# Patient Record
Sex: MALE | Race: WHITE | Employment: OTHER | URBAN - METROPOLITAN AREA
[De-identification: names, ages, dates, MRNs, and addresses within clinical notes are randomized per-mention and may not be internally consistent; named-entity substitution may affect disease eponyms.]

---

## 2017-11-03 PROBLEM — Z95.1 S/P CABG (CORONARY ARTERY BYPASS GRAFT): Status: ACTIVE | Noted: 2017-11-03

## 2017-11-03 PROBLEM — Z86.79 HISTORY OF ATRIAL FLUTTER: Status: ACTIVE | Noted: 2017-11-03

## 2017-11-03 PROBLEM — I44.0 FIRST DEGREE AV BLOCK: Status: ACTIVE | Noted: 2017-11-03

## 2020-06-14 PROBLEM — I73.9 PERIPHERAL VASCULAR DISEASE (HCC): Status: ACTIVE | Noted: 2020-06-14

## 2020-06-24 ENCOUNTER — HOSPITAL ENCOUNTER (OUTPATIENT)
Dept: CT IMAGING | Age: 85
Discharge: HOME | End: 2020-06-24
Attending: FAMILY MEDICINE

## 2020-06-24 DIAGNOSIS — F03.90 DEMENTIA (HCC): ICD-10-CM

## 2020-08-04 PROBLEM — G30.1 LATE ONSET ALZHEIMER'S DISEASE WITHOUT BEHAVIORAL DISTURBANCE (HCC): Status: ACTIVE | Noted: 2020-08-04

## 2020-08-04 PROBLEM — E53.8 B12 DEFICIENCY: Status: ACTIVE | Noted: 2020-08-04

## 2020-08-04 PROBLEM — G62.89 AXONAL NEUROPATHY: Status: ACTIVE | Noted: 2020-08-04

## 2020-08-04 PROBLEM — F02.80 LATE ONSET ALZHEIMER'S DISEASE WITHOUT BEHAVIORAL DISTURBANCE (HCC): Status: ACTIVE | Noted: 2020-08-04

## 2020-09-26 ENCOUNTER — HOSPITAL ENCOUNTER (EMERGENCY)
Age: 85
Discharge: HOME | End: 2020-09-26
Attending: EMERGENCY MEDICINE
Payer: MEDICARE

## 2020-09-26 ENCOUNTER — APPOINTMENT (OUTPATIENT)
Dept: CT IMAGING | Age: 85
End: 2020-09-26
Attending: EMERGENCY MEDICINE
Payer: MEDICARE

## 2020-09-26 ENCOUNTER — APPOINTMENT (OUTPATIENT)
Dept: GENERAL RADIOLOGY | Age: 85
End: 2020-09-26
Attending: EMERGENCY MEDICINE
Payer: MEDICARE

## 2020-09-26 VITALS
HEIGHT: 72 IN | HEART RATE: 69 BPM | SYSTOLIC BLOOD PRESSURE: 165 MMHG | DIASTOLIC BLOOD PRESSURE: 74 MMHG | WEIGHT: 184 LBS | OXYGEN SATURATION: 97 % | RESPIRATION RATE: 27 BRPM | TEMPERATURE: 98.5 F | BODY MASS INDEX: 24.92 KG/M2

## 2020-09-26 DIAGNOSIS — S00.03XA CONTUSION OF SCALP, INITIAL ENCOUNTER: ICD-10-CM

## 2020-09-26 DIAGNOSIS — M25.512 ACUTE PAIN OF LEFT SHOULDER: Primary | ICD-10-CM

## 2020-09-26 DIAGNOSIS — R60.9 PERIPHERAL EDEMA: ICD-10-CM

## 2020-09-26 LAB
ANION GAP SERPL CALC-SCNC: 5 MMOL/L (ref 7–16)
BNP SERPL-MCNC: 821 PG/ML
BUN SERPL-MCNC: 20 MG/DL (ref 8–23)
CALCIUM SERPL-MCNC: 8.7 MG/DL (ref 8.3–10.4)
CHLORIDE SERPL-SCNC: 109 MMOL/L (ref 98–107)
CO2 SERPL-SCNC: 28 MMOL/L (ref 21–32)
CREAT SERPL-MCNC: 1.34 MG/DL (ref 0.8–1.5)
GLUCOSE SERPL-MCNC: 117 MG/DL (ref 65–100)
MAGNESIUM SERPL-MCNC: 2.2 MG/DL (ref 1.8–2.4)
POTASSIUM SERPL-SCNC: 4.3 MMOL/L (ref 3.5–5.1)
SODIUM SERPL-SCNC: 142 MMOL/L (ref 136–145)
TROPONIN-HIGH SENSITIVITY: 12.4 PG/ML (ref 0–14)

## 2020-09-26 PROCEDURE — 73030 X-RAY EXAM OF SHOULDER: CPT

## 2020-09-26 PROCEDURE — 96375 TX/PRO/DX INJ NEW DRUG ADDON: CPT

## 2020-09-26 PROCEDURE — 83735 ASSAY OF MAGNESIUM: CPT

## 2020-09-26 PROCEDURE — 93005 ELECTROCARDIOGRAM TRACING: CPT | Performed by: EMERGENCY MEDICINE

## 2020-09-26 PROCEDURE — 80048 BASIC METABOLIC PNL TOTAL CA: CPT

## 2020-09-26 PROCEDURE — 99284 EMERGENCY DEPT VISIT MOD MDM: CPT

## 2020-09-26 PROCEDURE — 70450 CT HEAD/BRAIN W/O DYE: CPT

## 2020-09-26 PROCEDURE — 74011250636 HC RX REV CODE- 250/636: Performed by: EMERGENCY MEDICINE

## 2020-09-26 PROCEDURE — 83880 ASSAY OF NATRIURETIC PEPTIDE: CPT

## 2020-09-26 PROCEDURE — 84484 ASSAY OF TROPONIN QUANT: CPT

## 2020-09-26 PROCEDURE — 96374 THER/PROPH/DIAG INJ IV PUSH: CPT

## 2020-09-26 RX ORDER — MORPHINE SULFATE 2 MG/ML
4 INJECTION, SOLUTION INTRAMUSCULAR; INTRAVENOUS
Status: COMPLETED | OUTPATIENT
Start: 2020-09-26 | End: 2020-09-26

## 2020-09-26 RX ORDER — HYDROCHLOROTHIAZIDE 25 MG/1
25 TABLET ORAL DAILY
Qty: 7 TAB | Refills: 0 | Status: SHIPPED | OUTPATIENT
Start: 2020-09-26 | End: 2020-10-03

## 2020-09-26 RX ORDER — ONDANSETRON 2 MG/ML
4 INJECTION INTRAMUSCULAR; INTRAVENOUS
Status: COMPLETED | OUTPATIENT
Start: 2020-09-26 | End: 2020-09-26

## 2020-09-26 RX ADMIN — MORPHINE SULFATE 4 MG: 2 INJECTION, SOLUTION INTRAMUSCULAR; INTRAVENOUS at 19:45

## 2020-09-26 RX ADMIN — ONDANSETRON 4 MG: 2 INJECTION INTRAMUSCULAR; INTRAVENOUS at 19:55

## 2020-09-26 NOTE — ED PROVIDER NOTES
Chief complaint : Arm pain    HISTORY OF PRESENT ILLNESS :  Location : Left shoulder area    Quality : Ill-defined due to dementia    Quantity : Constant and acute    Timing :  Fell 9 days ago on the preceding Thursday however only started complaining of pain today    Severity : Ill-defined due to dementia    Alleviating / exacerbating factors : Arm pain seems to worsen somewhat with activity/movement    Associated Symptoms : Confounding historian due to dementia, if you ask if his neck hurts he answers affirmatively, then a moment later if he asked whether his neck feels fine, he again answers affirmatively    7:09 PM  Late breaking history, daughter indicates he was recently diagnosed with neuropathy and is concerned about his leg pain. Denied leg pain to me on initial history but she states \"you cannot trust what he says\". Later during the anticipated \"discharge debriefing\" when I confirm shoulder x-ray and head scanner finding, the daughter then volunteers \"oh, I was just worried he was having a heart attack\". Patient does have previous cardiac issues, but reported no chest pain on initial history,     Daughters asked what made her think it may be his heart, she reported that he was having arm pain from his shoulder all the way distally, again patient intimated to me that it was just his shoulder. 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       History reviewed. No pertinent surgical history. History reviewed. No pertinent family history.     Social History     Socioeconomic History    Marital status:      Spouse name: Not on file    Number of children: Not on file    Years of education: Not on file    Highest education level: Not on file   Occupational History    Not on file   Social Needs    Financial resource strain: Not on file    Food insecurity     Worry: Not on file Inability: Not on file    Transportation needs     Medical: Not on file     Non-medical: Not on file   Tobacco Use    Smoking status: Former Smoker     Last attempt to quit: 11/3/1964     Years since quittin.9    Smokeless tobacco: Never Used   Substance and Sexual Activity    Alcohol use: Not on file    Drug use: Not on file    Sexual activity: Not on file   Lifestyle    Physical activity     Days per week: Not on file     Minutes per session: Not on file    Stress: Not on file   Relationships    Social connections     Talks on phone: Not on file     Gets together: Not on file     Attends Taoist service: Not on file     Active member of club or organization: Not on file     Attends meetings of clubs or organizations: Not on file     Relationship status: Not on file    Intimate partner violence     Fear of current or ex partner: Not on file     Emotionally abused: Not on file     Physically abused: Not on file     Forced sexual activity: Not on file   Other Topics Concern    Not on file   Social History Narrative    Not on file         ALLERGIES: Penicillins    Review of Systems   Unable to perform ROS: Dementia   HENT: Negative for dental problem and nosebleeds. Eyes: Negative for pain and redness. Respiratory: Negative for shortness of breath and stridor. Cardiovascular: Negative for chest pain. Gastrointestinal: Negative for abdominal pain, nausea and vomiting. Genitourinary: Negative for difficulty urinating and hematuria. Musculoskeletal: Positive for arthralgias. Negative for back pain, gait problem, joint swelling, myalgias, neck pain and neck stiffness. Skin: Negative for pallor and wound. Neurological: Negative for dizziness, syncope, weakness, numbness and headaches. All other systems reviewed and are negative.       Vitals:    20 1722   BP: (!) 149/67   Pulse: 68   Resp: 18   Temp: 98.5 °F (36.9 °C)   SpO2: 97%   Weight: 83.5 kg (184 lb)   Height: 6' (1.829 m)            Physical Exam  Vitals signs and nursing note reviewed. Constitutional:       General: He is not in acute distress. Appearance: Normal appearance. He is well-developed. He is not ill-appearing, toxic-appearing or diaphoretic. HENT:      Head: Normocephalic and atraumatic. Eyes:      General:         Right eye: No discharge. Left eye: No discharge. Conjunctiva/sclera: Conjunctivae normal.   Neck:      Musculoskeletal: Full passive range of motion without pain, normal range of motion and neck supple. Normal range of motion. No pain with movement, spinous process tenderness or muscular tenderness. Cardiovascular:      Rate and Rhythm: Normal rate and regular rhythm. Pulmonary:      Effort: Pulmonary effort is normal. No respiratory distress. Breath sounds: Normal breath sounds. Chest:      Chest wall: No tenderness. Abdominal:      General: Abdomen is flat. There is no distension. Tenderness: There is no abdominal tenderness. There is no guarding or rebound. Musculoskeletal:         General: No tenderness or deformity. Right lower leg: Edema present. Left lower leg: Edema present. Skin:     General: Skin is warm and dry. Findings: No rash. Neurological:      General: No focal deficit present. Mental Status: He is alert. Mental status is at baseline. He is disoriented. Motor: No abnormal muscle tone. Comments: cni 2-12 grossly  Nl gait,  Nl speech     Psychiatric:         Mood and Affect: Mood normal.         Behavior: Behavior normal.          MDM  Number of Diagnoses or Management Options  Acute pain of left shoulder:   Contusion of scalp, initial encounter:   Peripheral edema:   Diagnosis management comments: Medical decision making note:  5day-old fall with head injury and now left shoulder pain. Will CT head and x-ray left shoulder.   7:13 PM  CT head and left shoulder are okay, late breaking concerns of cardiac issues, will perform an EKG and troponin. Patient does have some dependent edema in his lower extremities, patient sits most of the time in his chair, I suspect his pain is probably related to his recently diagnosed neuropathy. Exam inconsistent with any specific contusion or fracture type issue. 8:17 PM  EKG is nondiagnostic, and paced, troponin is negative, patient has some peripheral edema for which we will prescribe some HCTZ  This concludes the \"medical decision making note\" part of this emergency department visit note.            Procedures

## 2020-09-26 NOTE — ED NOTES
Report from [facility], [address] [place] [address]. Pt lying on stretcher with family at bedside. Pt in no apparent distress. Awaiting disposition at this time. Will continue to monitor.

## 2020-09-26 NOTE — ED TRIAGE NOTES
Per EMS pt fell out of bed 9 days ago and hit is head and left shoulder. Pt currently takes blood thinners. Abrasion present on forehead. Pt also advises he is unable to lift his left arm.

## 2020-09-27 LAB
ATRIAL RATE: 62 BPM
CALCULATED P AXIS, ECG09: 83 DEGREES
CALCULATED R AXIS, ECG10: -37 DEGREES
CALCULATED T AXIS, ECG11: 79 DEGREES
DIAGNOSIS, 93000: NORMAL
P-R INTERVAL, ECG05: 180 MS
Q-T INTERVAL, ECG07: 480 MS
QRS DURATION, ECG06: 154 MS
QTC CALCULATION (BEZET), ECG08: 491 MS
VENTRICULAR RATE, ECG03: 63 BPM

## 2020-09-27 NOTE — DISCHARGE INSTRUCTIONS
Patient Education        Leg and Ankle Edema: Care Instructions  Your Care Instructions  Swelling in the legs, ankles, and feet is called edema. It is common after you sit or stand for a while. Long plane flights or car rides often cause swelling in the legs and feet. You may also have swelling if you have to stand for long periods of time at your job. Problems with the veins in the legs (varicose veins) and changes in hormones can also cause swelling. Sometimes the swelling in the ankles and feet is caused by a more serious problem, such as heart failure, infection, blood clots, or liver or kidney disease. Follow-up care is a key part of your treatment and safety. Be sure to make and go to all appointments, and call your doctor if you are having problems. It's also a good idea to know your test results and keep a list of the medicines you take. How can you care for yourself at home? · If your doctor gave you medicine, take it as prescribed. Call your doctor if you think you are having a problem with your medicine. · Whenever you are resting, raise your legs up. Try to keep the swollen area higher than the level of your heart. · Take breaks from standing or sitting in one position. ? Walk around to increase the blood flow in your lower legs. ? Move your feet and ankles often while you stand, or tighten and relax your leg muscles. · Wear support stockings. Put them on in the morning, before swelling gets worse. · Eat a balanced diet. Lose weight if you need to. · Limit the amount of salt (sodium) in your diet. Salt holds fluid in the body and may increase swelling. When should you call for help? Call 911 anytime you think you may need emergency care. For example, call if:    · You have symptoms of a blood clot in your lung (called a pulmonary embolism). These may include:  ? Sudden chest pain. ? Trouble breathing. ? Coughing up blood.    Call your doctor now or seek immediate medical care if:    · You have signs of a blood clot, such as:  ? Pain in your calf, back of the knee, thigh, or groin. ? Redness and swelling in your leg or groin.     · You have symptoms of infection, such as:  ? Increased pain, swelling, warmth, or redness. ? Red streaks or pus. ? A fever. Watch closely for changes in your health, and be sure to contact your doctor if:    · Your swelling is getting worse.     · You have new or worsening pain in your legs.     · You do not get better as expected. Where can you learn more? Go to http://jaxon-mando.info/  Enter G913 in the search box to learn more about \"Leg and Ankle Edema: Care Instructions. \"  Current as of: June 26, 2019               Content Version: 12.6  © 0482-1131 [employer]. Care instructions adapted under license by [employer] (which disclaims liability or warranty for this information). If you have questions about a medical condition or this instruction, always ask your healthcare professional. [place] [address] any warranty or liability for your use of this information.

## 2020-09-27 NOTE — ED NOTES
I have reviewed discharge instructions with the patient. The patient verbalized understanding. Patient left ED via Discharge Method: wheelchair to Home with family. Opportunity for questions and clarification provided. Patient given 1 scripts. To continue your aftercare when you leave the hospital, you may receive an automated call from our care team to check in on how you are doing. This is a free service and part of our promise to provide the best care and service to meet your aftercare needs.  If you have questions, or wish to unsubscribe from this service please call [phone]. Thank you for Choosing our [facility] Emergency Department.

## 2020-11-18 PROBLEM — F32.9 REACTIVE DEPRESSION: Status: ACTIVE | Noted: 2020-11-18

## 2021-03-16 PROBLEM — R26.81 UNSTEADY GAIT: Status: ACTIVE | Noted: 2021-03-16

## 2022-02-24 ENCOUNTER — APPOINTMENT (OUTPATIENT)
Dept: CT IMAGING | Age: 87
DRG: 689 | End: 2022-02-24
Attending: STUDENT IN AN ORGANIZED HEALTH CARE EDUCATION/TRAINING PROGRAM
Payer: MEDICARE

## 2022-02-24 ENCOUNTER — HOSPITAL ENCOUNTER (INPATIENT)
Age: 87
LOS: 5 days | Discharge: NURSING, REHAB OR LONG-TERM CARE | DRG: 689 | End: 2022-03-01
Attending: STUDENT IN AN ORGANIZED HEALTH CARE EDUCATION/TRAINING PROGRAM | Admitting: FAMILY MEDICINE
Payer: MEDICARE

## 2022-02-24 ENCOUNTER — APPOINTMENT (OUTPATIENT)
Dept: GENERAL RADIOLOGY | Age: 87
DRG: 689 | End: 2022-02-24
Attending: EMERGENCY MEDICINE
Payer: MEDICARE

## 2022-02-24 ENCOUNTER — APPOINTMENT (OUTPATIENT)
Dept: GENERAL RADIOLOGY | Age: 87
DRG: 689 | End: 2022-02-24
Attending: STUDENT IN AN ORGANIZED HEALTH CARE EDUCATION/TRAINING PROGRAM
Payer: MEDICARE

## 2022-02-24 DIAGNOSIS — R41.82 ALTERED MENTAL STATUS, UNSPECIFIED ALTERED MENTAL STATUS TYPE: Primary | ICD-10-CM

## 2022-02-24 DIAGNOSIS — S42.031A CLOSED DISPLACED FRACTURE OF ACROMIAL END OF RIGHT CLAVICLE, INITIAL ENCOUNTER: ICD-10-CM

## 2022-02-24 PROBLEM — D64.9 NORMOCYTIC ANEMIA: Status: ACTIVE | Noted: 2022-02-24

## 2022-02-24 PROBLEM — N39.0 UTI (URINARY TRACT INFECTION): Status: ACTIVE | Noted: 2022-02-24

## 2022-02-24 PROBLEM — G93.41 ACUTE METABOLIC ENCEPHALOPATHY: Status: ACTIVE | Noted: 2022-02-24

## 2022-02-24 PROBLEM — D72.829 LEUKOCYTOSIS: Status: ACTIVE | Noted: 2022-02-24

## 2022-02-24 LAB
ALBUMIN SERPL-MCNC: 3.4 G/DL (ref 3.2–4.6)
ALBUMIN/GLOB SERPL: 1.1 {RATIO} (ref 1.2–3.5)
ALP SERPL-CCNC: 94 U/L (ref 50–136)
ALT SERPL-CCNC: 18 U/L (ref 12–65)
ANION GAP SERPL CALC-SCNC: 3 MMOL/L (ref 7–16)
APPEARANCE UR: CLEAR
AST SERPL-CCNC: 25 U/L (ref 15–37)
BACTERIA URNS QL MICRO: 0 /HPF
BASOPHILS # BLD: 0.1 K/UL (ref 0–0.2)
BASOPHILS NFR BLD: 1 % (ref 0–2)
BILIRUB SERPL-MCNC: 0.5 MG/DL (ref 0.2–1.1)
BILIRUB UR QL: NEGATIVE
BUN SERPL-MCNC: 30 MG/DL (ref 8–23)
CALCIUM SERPL-MCNC: 9.1 MG/DL (ref 8.3–10.4)
CASTS URNS QL MICRO: 0 /LPF
CHLORIDE SERPL-SCNC: 110 MMOL/L (ref 98–107)
CO2 SERPL-SCNC: 30 MMOL/L (ref 21–32)
COLOR UR: YELLOW
COVID-19 RAPID TEST, COVR: NOT DETECTED
CREAT SERPL-MCNC: 1.21 MG/DL (ref 0.8–1.5)
DIFFERENTIAL METHOD BLD: ABNORMAL
EOSINOPHIL # BLD: 0.5 K/UL (ref 0–0.8)
EOSINOPHIL NFR BLD: 4 % (ref 0.5–7.8)
EPI CELLS #/AREA URNS HPF: ABNORMAL /HPF
ERYTHROCYTE [DISTWIDTH] IN BLOOD BY AUTOMATED COUNT: 15.5 % (ref 11.9–14.6)
GLOBULIN SER CALC-MCNC: 3.2 G/DL (ref 2.3–3.5)
GLUCOSE SERPL-MCNC: 85 MG/DL (ref 65–100)
GLUCOSE UR STRIP.AUTO-MCNC: NEGATIVE MG/DL
HCT VFR BLD AUTO: 38.8 % (ref 41.1–50.3)
HGB BLD-MCNC: 11.8 G/DL (ref 13.6–17.2)
HGB UR QL STRIP: ABNORMAL
IMM GRANULOCYTES # BLD AUTO: 0 K/UL (ref 0–0.5)
IMM GRANULOCYTES NFR BLD AUTO: 0 % (ref 0–5)
KETONES UR QL STRIP.AUTO: NEGATIVE MG/DL
LACTATE SERPL-SCNC: 0.6 MMOL/L (ref 0.4–2)
LACTATE SERPL-SCNC: 0.7 MMOL/L (ref 0.4–2)
LEUKOCYTE ESTERASE UR QL STRIP.AUTO: ABNORMAL
LYMPHOCYTES # BLD: 1.2 K/UL (ref 0.5–4.6)
LYMPHOCYTES NFR BLD: 11 % (ref 13–44)
MCH RBC QN AUTO: 26.5 PG (ref 26.1–32.9)
MCHC RBC AUTO-ENTMCNC: 30.4 G/DL (ref 31.4–35)
MCV RBC AUTO: 87 FL (ref 79.6–97.8)
MONOCYTES # BLD: 0.7 K/UL (ref 0.1–1.3)
MONOCYTES NFR BLD: 6 % (ref 4–12)
NEUTS SEG # BLD: 9.1 K/UL (ref 1.7–8.2)
NEUTS SEG NFR BLD: 79 % (ref 43–78)
NITRITE UR QL STRIP.AUTO: NEGATIVE
NRBC # BLD: 0 K/UL (ref 0–0.2)
PH UR STRIP: 6 [PH] (ref 5–9)
PLATELET # BLD AUTO: 221 K/UL (ref 150–450)
PMV BLD AUTO: 11.2 FL (ref 9.4–12.3)
POTASSIUM SERPL-SCNC: 3.9 MMOL/L (ref 3.5–5.1)
PROT SERPL-MCNC: 6.6 G/DL (ref 6.3–8.2)
PROT UR STRIP-MCNC: NEGATIVE MG/DL
RBC # BLD AUTO: 4.46 M/UL (ref 4.23–5.6)
RBC #/AREA URNS HPF: >100 /HPF
SODIUM SERPL-SCNC: 143 MMOL/L (ref 136–145)
SOURCE, COVRS: NORMAL
SP GR UR REFRACTOMETRY: 1.01 (ref 1–1.02)
TSH SERPL DL<=0.005 MIU/L-ACNC: 1.02 UIU/ML
UROBILINOGEN UR QL STRIP.AUTO: 0.2 EU/DL (ref 0.2–1)
WBC # BLD AUTO: 11.6 K/UL (ref 4.3–11.1)
WBC URNS QL MICRO: ABNORMAL /HPF

## 2022-02-24 PROCEDURE — 87635 SARS-COV-2 COVID-19 AMP PRB: CPT

## 2022-02-24 PROCEDURE — 72170 X-RAY EXAM OF PELVIS: CPT

## 2022-02-24 PROCEDURE — 87040 BLOOD CULTURE FOR BACTERIA: CPT

## 2022-02-24 PROCEDURE — 83605 ASSAY OF LACTIC ACID: CPT

## 2022-02-24 PROCEDURE — 70450 CT HEAD/BRAIN W/O DYE: CPT

## 2022-02-24 PROCEDURE — 74011000250 HC RX REV CODE- 250: Performed by: FAMILY MEDICINE

## 2022-02-24 PROCEDURE — 72125 CT NECK SPINE W/O DYE: CPT

## 2022-02-24 PROCEDURE — 99285 EMERGENCY DEPT VISIT HI MDM: CPT

## 2022-02-24 PROCEDURE — 93005 ELECTROCARDIOGRAM TRACING: CPT | Performed by: EMERGENCY MEDICINE

## 2022-02-24 PROCEDURE — 74011000258 HC RX REV CODE- 258: Performed by: FAMILY MEDICINE

## 2022-02-24 PROCEDURE — 80053 COMPREHEN METABOLIC PANEL: CPT

## 2022-02-24 PROCEDURE — 81003 URINALYSIS AUTO W/O SCOPE: CPT

## 2022-02-24 PROCEDURE — 74011250637 HC RX REV CODE- 250/637: Performed by: FAMILY MEDICINE

## 2022-02-24 PROCEDURE — 85025 COMPLETE CBC W/AUTO DIFF WBC: CPT

## 2022-02-24 PROCEDURE — 65270000029 HC RM PRIVATE

## 2022-02-24 PROCEDURE — 81001 URINALYSIS AUTO W/SCOPE: CPT

## 2022-02-24 PROCEDURE — 74011250636 HC RX REV CODE- 250/636: Performed by: FAMILY MEDICINE

## 2022-02-24 PROCEDURE — 84443 ASSAY THYROID STIM HORMONE: CPT

## 2022-02-24 PROCEDURE — 87086 URINE CULTURE/COLONY COUNT: CPT

## 2022-02-24 PROCEDURE — 71045 X-RAY EXAM CHEST 1 VIEW: CPT

## 2022-02-24 PROCEDURE — 93005 ELECTROCARDIOGRAM TRACING: CPT | Performed by: STUDENT IN AN ORGANIZED HEALTH CARE EDUCATION/TRAINING PROGRAM

## 2022-02-24 RX ORDER — MEMANTINE HYDROCHLORIDE 5 MG/1
15 TABLET ORAL
Status: DISCONTINUED | OUTPATIENT
Start: 2022-02-25 | End: 2022-03-01 | Stop reason: HOSPADM

## 2022-02-24 RX ORDER — SERTRALINE HYDROCHLORIDE 50 MG/1
50 TABLET, FILM COATED ORAL
Status: DISCONTINUED | OUTPATIENT
Start: 2022-02-24 | End: 2022-03-01 | Stop reason: HOSPADM

## 2022-02-24 RX ORDER — ISOSORBIDE MONONITRATE 30 MG/1
30 TABLET, EXTENDED RELEASE ORAL
Status: DISCONTINUED | OUTPATIENT
Start: 2022-02-24 | End: 2022-03-01 | Stop reason: HOSPADM

## 2022-02-24 RX ORDER — ONDANSETRON 2 MG/ML
4 INJECTION INTRAMUSCULAR; INTRAVENOUS
Status: DISCONTINUED | OUTPATIENT
Start: 2022-02-24 | End: 2022-03-01 | Stop reason: HOSPADM

## 2022-02-24 RX ORDER — ACETAMINOPHEN 325 MG/1
650 TABLET ORAL
Status: DISCONTINUED | OUTPATIENT
Start: 2022-02-24 | End: 2022-03-01 | Stop reason: HOSPADM

## 2022-02-24 RX ORDER — SODIUM CHLORIDE 0.9 % (FLUSH) 0.9 %
5-40 SYRINGE (ML) INJECTION EVERY 8 HOURS
Status: DISCONTINUED | OUTPATIENT
Start: 2022-02-24 | End: 2022-03-01 | Stop reason: HOSPADM

## 2022-02-24 RX ORDER — ONDANSETRON 4 MG/1
4 TABLET, ORALLY DISINTEGRATING ORAL
Status: DISCONTINUED | OUTPATIENT
Start: 2022-02-24 | End: 2022-03-01 | Stop reason: HOSPADM

## 2022-02-24 RX ORDER — SODIUM CHLORIDE 0.9 % (FLUSH) 0.9 %
5-40 SYRINGE (ML) INJECTION AS NEEDED
Status: DISCONTINUED | OUTPATIENT
Start: 2022-02-24 | End: 2022-03-01 | Stop reason: HOSPADM

## 2022-02-24 RX ORDER — BISMUTH SUBSALICYLATE 262 MG
1 TABLET,CHEWABLE ORAL DAILY
COMMUNITY

## 2022-02-24 RX ORDER — ATORVASTATIN CALCIUM 10 MG/1
20 TABLET, FILM COATED ORAL
Status: DISCONTINUED | OUTPATIENT
Start: 2022-02-24 | End: 2022-03-01 | Stop reason: HOSPADM

## 2022-02-24 RX ORDER — ASPIRIN 81 MG/1
81 TABLET ORAL DAILY
Status: DISCONTINUED | OUTPATIENT
Start: 2022-02-25 | End: 2022-03-01 | Stop reason: HOSPADM

## 2022-02-24 RX ORDER — ASCORBIC ACID 500 MG
500 TABLET ORAL DAILY
COMMUNITY

## 2022-02-24 RX ORDER — ACETAMINOPHEN 650 MG/1
650 SUPPOSITORY RECTAL
Status: DISCONTINUED | OUTPATIENT
Start: 2022-02-24 | End: 2022-03-01 | Stop reason: HOSPADM

## 2022-02-24 RX ORDER — ATORVASTATIN CALCIUM 10 MG/1
20 TABLET, FILM COATED ORAL
Status: CANCELLED | OUTPATIENT
Start: 2022-02-24

## 2022-02-24 RX ORDER — SODIUM CHLORIDE 9 MG/ML
100 INJECTION, SOLUTION INTRAVENOUS CONTINUOUS
Status: DISCONTINUED | OUTPATIENT
Start: 2022-02-24 | End: 2022-03-01 | Stop reason: HOSPADM

## 2022-02-24 RX ORDER — ISOSORBIDE MONONITRATE 60 MG/1
60 TABLET, EXTENDED RELEASE ORAL DAILY
Status: CANCELLED | OUTPATIENT
Start: 2022-02-25

## 2022-02-24 RX ORDER — NITROGLYCERIN 0.4 MG/1
0.4 TABLET SUBLINGUAL AS NEEDED
Status: DISCONTINUED | OUTPATIENT
Start: 2022-02-24 | End: 2022-03-01 | Stop reason: HOSPADM

## 2022-02-24 RX ORDER — MEMANTINE HYDROCHLORIDE 5 MG/1
10 TABLET ORAL 2 TIMES DAILY
Status: CANCELLED | OUTPATIENT
Start: 2022-02-24

## 2022-02-24 RX ORDER — POLYETHYLENE GLYCOL 3350 17 G/17G
17 POWDER, FOR SOLUTION ORAL DAILY PRN
Status: DISCONTINUED | OUTPATIENT
Start: 2022-02-24 | End: 2022-03-01 | Stop reason: HOSPADM

## 2022-02-24 RX ORDER — DONEPEZIL HYDROCHLORIDE 5 MG/1
10 TABLET, FILM COATED ORAL DAILY
Status: DISCONTINUED | OUTPATIENT
Start: 2022-02-25 | End: 2022-03-01 | Stop reason: HOSPADM

## 2022-02-24 RX ORDER — SERTRALINE HYDROCHLORIDE 50 MG/1
50 TABLET, FILM COATED ORAL EVERY EVENING
Status: CANCELLED | OUTPATIENT
Start: 2022-02-24

## 2022-02-24 RX ADMIN — CEFTRIAXONE 1 G: 1 INJECTION, POWDER, FOR SOLUTION INTRAMUSCULAR; INTRAVENOUS at 23:05

## 2022-02-24 RX ADMIN — SODIUM CHLORIDE, PRESERVATIVE FREE 10 ML: 5 INJECTION INTRAVENOUS at 22:57

## 2022-02-24 RX ADMIN — ISOSORBIDE MONONITRATE 30 MG: 30 TABLET, EXTENDED RELEASE ORAL at 23:17

## 2022-02-24 RX ADMIN — MEMANTINE 15 MG: 5 TABLET ORAL at 23:17

## 2022-02-24 RX ADMIN — SERTRALINE 50 MG: 50 TABLET, FILM COATED ORAL at 23:05

## 2022-02-24 RX ADMIN — ATORVASTATIN CALCIUM 20 MG: 10 TABLET, FILM COATED ORAL at 23:17

## 2022-02-24 RX ADMIN — SODIUM CHLORIDE 100 ML/HR: 900 INJECTION, SOLUTION INTRAVENOUS at 22:57

## 2022-02-24 NOTE — ED PROVIDER NOTES
80-year-old male patient with history of Alzheimer's dementia presents to the ER with reports of altered mental status. Family is noted patient's ability to ambulate has significantly decreased over the past 3 to 4 days. They describe a shuffling gait that limits patient's mobility quite significantly. While he is mostly nonverbal, he occasionally provides verbal responses to questioning. Family states patient became completely nonverbal around 8 PM last evening. At some point during the night, family suspects around 3 AM, patient was found seated upright on the floor having suffered a suspected fall. Patient's wife was able to get him back in bed. He has been essentially bedbound since the fall. Patient provides no verbal response to questioning. Opens eyes to voice. Family at bedside. 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 No family history on file.     Social History     Socioeconomic History    Marital status:      Spouse name: Not on file    Number of children: Not on file    Years of education: Not on file    Highest education level: Not on file   Occupational History    Not on file   Tobacco Use    Smoking status: Former Smoker     Quit date: 11/3/1964     Years since quittin.4    Smokeless tobacco: Never Used   Substance and Sexual Activity    Alcohol use: Not on file    Drug use: Not on file    Sexual activity: Not on file   Other Topics Concern    Not on file   Social History Narrative    Not on file     Social Determinants of Health     Financial Resource Strain:     Difficulty of Paying Living Expenses: Not on file   Food Insecurity:     Worried About Running Out of Food in the Last Year: Not on file    Luciano of Food in the Last Year: Not on file   Transportation Needs:     Lack of Transportation (Medical): Not on file    Lack of Transportation (Non-Medical): Not on file   Physical Activity:     Days of Exercise per Week: Not on file    Minutes of Exercise per Session: Not on file   Stress:     Feeling of Stress : Not on file   Social Connections:     Frequency of Communication with Friends and Family: Not on file    Frequency of Social Gatherings with Friends and Family: Not on file    Attends Jew Services: Not on file    Active Member of [address] [place] [address] or Organizations: Not on file    Attends Club or Organization Meetings: Not on file    Marital Status: Not on file   Intimate Partner Violence:     Fear of Current or Ex-Partner: Not on file    Emotionally Abused: Not on file    Physically Abused: Not on file    Sexually Abused: Not on file   Housing Stability:     Unable to Pay for Housing in the Last Year: Not on file    Number of [place] in the Last Year: Not on file    Unstable Housing in the Last Year: Not on file         ALLERGIES: Penicillins    Review of Systems   Unable to perform ROS: Mental status change       Vitals:    02/24/22 1501   BP: (!) 166/75   Pulse: 60   Resp: 16   Temp: 97.4 °F (36.3 °C)   SpO2: 99%   Weight: 70.3 kg (155 lb)   Height: 6' (1.829 m)            Physical Exam  Vitals and nursing note reviewed. Constitutional:       General: He is not in acute distress. Appearance: He is well-developed. He is not diaphoretic. Comments: Elderly male patient, provides no verbal response to questioning, opens eyes to voice, follows basic commands. No acute distress. HENT:      Head: Normocephalic and atraumatic. Right Ear: Tympanic membrane, ear canal and external ear normal.      Left Ear: Tympanic membrane, ear canal and external ear normal.      Nose: Nose normal.   Eyes:      Pupils: Pupils are equal, round, and reactive to light. Neck:      Comments: Cervical spine collar is in place.   No palpable step-off or deformity  Cardiovascular:      Rate and Rhythm: Normal rate and regular rhythm. Heart sounds: Normal heart sounds. No murmur heard. No friction rub. No gallop. Pulmonary:      Effort: Pulmonary effort is normal. No respiratory distress. Breath sounds: Normal breath sounds. No stridor. No decreased breath sounds, wheezing, rhonchi or rales. Chest:      Chest wall: No tenderness. Comments: Palpable deformity to the right clavicle over the lateral aspect. Abdominal:      General: There is no distension. Palpations: Abdomen is soft. There is no mass. Tenderness: There is no abdominal tenderness. There is no guarding or rebound. Hernia: No hernia is present. Musculoskeletal:         General: No tenderness or deformity. Normal range of motion. Cervical back: Normal range of motion. Comments: Lower extremities are rigid to attempted range of motion testing though no evidence of pain with range of motion testing on my exam.  Distal pulses are intact with brisk capillary refill present in all 4 extremities   Skin:     General: Skin is warm and dry. Neurological:      Mental Status: He is alert and oriented to person, place, and time. Cranial Nerves: No cranial nerve deficit. MDM  Number of Diagnoses or Management Options  Altered mental status, unspecified altered mental status type: new and requires workup  Closed displaced fracture of acromial end of right clavicle, initial encounter: new and requires workup  Diagnosis management comments: Chest x-ray shows evidence of displaced clavicular fracture. Labs appear stable, awaiting CT imaging of the head neck. Overall patient's labs appear stable, CT imaging of the head, neck, chest and pelvis are overall normal however patient does have a clavicular fracture of unknown acuity on his chest x-ray.   Family voices concern for patient staying at home at present as his wife is elderly and unable to care for him. Spoke to hospitalist concerning patient's case, agree to evaluate for admission. Voice dictation software was used during the making of this note. This software is not perfect and grammatical and other typographical errors may be present. This note has been proofread, but may still contain errors.   [address] Doctor Shukri Melendez [facility]; 2/24/2022 @7:29 PM   ===================================================================         Amount and/or Complexity of Data Reviewed  Clinical lab tests: ordered and reviewed  Tests in the radiology section of CPT®: ordered and reviewed  Tests in the medicine section of CPT®: ordered and reviewed  Obtain history from someone other than the patient: yes  Discuss the patient with other providers: yes  Independent visualization of images, tracings, or specimens: yes    Risk of Complications, Morbidity, and/or Mortality  Presenting problems: moderate  Diagnostic procedures: low  Management options: moderate    Patient Progress  Patient progress: stable         Procedures

## 2022-02-24 NOTE — ED TRIAGE NOTES
Pt from home with Ems. HX of alzheimer's. Per EMS wife stated he seemed more lethargic and tired since yesterday and has not spoken since last night before bed. Last night wife found patient sitting in the floor next to the bed. Pt is mobile at baseline. Small abrasion noted to left elbow, but pt moving all extremities. Per EMS pt jaimie to stand and sit with lots of assistance.

## 2022-02-25 LAB
ALBUMIN SERPL-MCNC: 2.8 G/DL (ref 3.2–4.6)
ALBUMIN/GLOB SERPL: 0.9 {RATIO} (ref 1.2–3.5)
ALP SERPL-CCNC: 85 U/L (ref 50–136)
ALT SERPL-CCNC: 16 U/L (ref 12–65)
ANION GAP SERPL CALC-SCNC: 5 MMOL/L (ref 7–16)
AST SERPL-CCNC: 22 U/L (ref 15–37)
BASOPHILS # BLD: 0 K/UL (ref 0–0.2)
BASOPHILS NFR BLD: 0 % (ref 0–2)
BILIRUB SERPL-MCNC: 0.4 MG/DL (ref 0.2–1.1)
BUN SERPL-MCNC: 27 MG/DL (ref 8–23)
CALCIUM SERPL-MCNC: 8.7 MG/DL (ref 8.3–10.4)
CHLORIDE SERPL-SCNC: 111 MMOL/L (ref 98–107)
CO2 SERPL-SCNC: 27 MMOL/L (ref 21–32)
CREAT SERPL-MCNC: 1.04 MG/DL (ref 0.8–1.5)
DIFFERENTIAL METHOD BLD: ABNORMAL
EOSINOPHIL # BLD: 0.6 K/UL (ref 0–0.8)
EOSINOPHIL NFR BLD: 6 % (ref 0.5–7.8)
ERYTHROCYTE [DISTWIDTH] IN BLOOD BY AUTOMATED COUNT: 15.6 % (ref 11.9–14.6)
GLOBULIN SER CALC-MCNC: 3.1 G/DL (ref 2.3–3.5)
GLUCOSE SERPL-MCNC: 74 MG/DL (ref 65–100)
HCT VFR BLD AUTO: 36.5 % (ref 41.1–50.3)
HGB BLD-MCNC: 11.4 G/DL (ref 13.6–17.2)
IMM GRANULOCYTES # BLD AUTO: 0 K/UL (ref 0–0.5)
IMM GRANULOCYTES NFR BLD AUTO: 0 % (ref 0–5)
LYMPHOCYTES # BLD: 1.1 K/UL (ref 0.5–4.6)
LYMPHOCYTES NFR BLD: 12 % (ref 13–44)
MCH RBC QN AUTO: 26.8 PG (ref 26.1–32.9)
MCHC RBC AUTO-ENTMCNC: 31.2 G/DL (ref 31.4–35)
MCV RBC AUTO: 85.9 FL (ref 79.6–97.8)
MONOCYTES # BLD: 0.6 K/UL (ref 0.1–1.3)
MONOCYTES NFR BLD: 7 % (ref 4–12)
NEUTS SEG # BLD: 6.8 K/UL (ref 1.7–8.2)
NEUTS SEG NFR BLD: 75 % (ref 43–78)
NRBC # BLD: 0 K/UL (ref 0–0.2)
PLATELET # BLD AUTO: 203 K/UL (ref 150–450)
PMV BLD AUTO: 10.3 FL (ref 9.4–12.3)
POTASSIUM SERPL-SCNC: 3.8 MMOL/L (ref 3.5–5.1)
PROT SERPL-MCNC: 5.9 G/DL (ref 6.3–8.2)
RBC # BLD AUTO: 4.25 M/UL (ref 4.23–5.6)
SODIUM SERPL-SCNC: 143 MMOL/L (ref 136–145)
WBC # BLD AUTO: 9.2 K/UL (ref 4.3–11.1)

## 2022-02-25 PROCEDURE — 65270000029 HC RM PRIVATE

## 2022-02-25 PROCEDURE — 74011250637 HC RX REV CODE- 250/637: Performed by: FAMILY MEDICINE

## 2022-02-25 PROCEDURE — 86580 TB INTRADERMAL TEST: CPT | Performed by: NURSE PRACTITIONER

## 2022-02-25 PROCEDURE — 36415 COLL VENOUS BLD VENIPUNCTURE: CPT

## 2022-02-25 PROCEDURE — 74011000250 HC RX REV CODE- 250: Performed by: FAMILY MEDICINE

## 2022-02-25 PROCEDURE — 85025 COMPLETE CBC W/AUTO DIFF WBC: CPT

## 2022-02-25 PROCEDURE — 97162 PT EVAL MOD COMPLEX 30 MIN: CPT

## 2022-02-25 PROCEDURE — 80053 COMPREHEN METABOLIC PANEL: CPT

## 2022-02-25 PROCEDURE — 74011000258 HC RX REV CODE- 258: Performed by: FAMILY MEDICINE

## 2022-02-25 PROCEDURE — 74011000250 HC RX REV CODE- 250: Performed by: NURSE PRACTITIONER

## 2022-02-25 PROCEDURE — 74011250636 HC RX REV CODE- 250/636: Performed by: FAMILY MEDICINE

## 2022-02-25 RX ADMIN — CEFTRIAXONE 1 G: 1 INJECTION, POWDER, FOR SOLUTION INTRAMUSCULAR; INTRAVENOUS at 21:37

## 2022-02-25 RX ADMIN — SODIUM CHLORIDE 100 ML/HR: 900 INJECTION, SOLUTION INTRAVENOUS at 09:07

## 2022-02-25 RX ADMIN — TUBERCULIN PURIFIED PROTEIN DERIVATIVE 5 UNITS: 5 INJECTION, SOLUTION INTRADERMAL at 09:15

## 2022-02-25 RX ADMIN — SODIUM CHLORIDE 100 ML/HR: 900 INJECTION, SOLUTION INTRAVENOUS at 23:00

## 2022-02-25 RX ADMIN — MEMANTINE 15 MG: 5 TABLET ORAL at 21:36

## 2022-02-25 RX ADMIN — SODIUM CHLORIDE, PRESERVATIVE FREE 10 ML: 5 INJECTION INTRAVENOUS at 21:41

## 2022-02-25 RX ADMIN — ATORVASTATIN CALCIUM 20 MG: 10 TABLET, FILM COATED ORAL at 21:37

## 2022-02-25 RX ADMIN — Medication 81 MG: at 09:18

## 2022-02-25 RX ADMIN — APIXABAN 5 MG: 5 TABLET, FILM COATED ORAL at 21:37

## 2022-02-25 RX ADMIN — SODIUM CHLORIDE, PRESERVATIVE FREE 10 ML: 5 INJECTION INTRAVENOUS at 14:00

## 2022-02-25 RX ADMIN — DONEPEZIL HYDROCHLORIDE 10 MG: 5 TABLET, FILM COATED ORAL at 09:18

## 2022-02-25 RX ADMIN — ISOSORBIDE MONONITRATE 30 MG: 30 TABLET, EXTENDED RELEASE ORAL at 21:36

## 2022-02-25 RX ADMIN — SODIUM CHLORIDE, PRESERVATIVE FREE 10 ML: 5 INJECTION INTRAVENOUS at 05:16

## 2022-02-25 RX ADMIN — APIXABAN 5 MG: 5 TABLET, FILM COATED ORAL at 09:18

## 2022-02-25 RX ADMIN — SODIUM CHLORIDE 100 ML/HR: 900 INJECTION, SOLUTION INTRAVENOUS at 18:12

## 2022-02-25 RX ADMIN — SERTRALINE 50 MG: 50 TABLET, FILM COATED ORAL at 21:37

## 2022-02-25 NOTE — ED NOTES
TRANSFER - OUT REPORT:    Verbal report given to Chery Wilson RN on [employer]  being transferred to [id number] for routine progression of care       Report consisted of patients Situation, Background, Assessment and   Recommendations(SBAR). Information from the following report(s) ED Summary was reviewed with the receiving nurse. Lines:   Peripheral IV 02/24/22 Left Antecubital (Active)   Site Assessment Clean, dry, & intact 02/24/22 1525   Phlebitis Assessment 0 02/24/22 1525   Infiltration Assessment 0 02/24/22 1525   Dressing Status Clean, dry, & intact 02/24/22 1525        Opportunity for questions and clarification was provided.       Patient transported with:   Registered Nurse

## 2022-02-25 NOTE — PROGRESS NOTES
Dual skin assessment completed with Avi Wiggins RN. Patient noted to have bruising on Bilateral upper extremeties with a bleeding skin tear on the lateral aspect of the RUE, blanchable erythema on the sacral region, dressing applied for prevention of breakdown. R buttock has a tear. Toes noted to have black scabs, seeing podiatrist for the same.

## 2022-02-25 NOTE — PROGRESS NOTES
TRANSFER - IN REPORT:    Verbal report received from Bryan Restrepo RN on [facility]  being received from ED for routine progression of care      Report consisted of patients Situation, Background, Assessment and   Recommendations(SBAR). Information from the following report(s) SBAR, ED Summary and Recent Results was reviewed with the receiving nurse. Opportunity for questions and clarification was provided. Assessment will be completed upon patients arrival to unit and care assumed.

## 2022-02-25 NOTE — PROGRESS NOTES
02/24/22 2141   Dual Skin Pressure Injury Assessment   Dual Skin Pressure Injury Assessment X   Second Care Provider (Based on [address] [place] [address]) Flori Shrestha. JAMEL   Sacrum  Right Side;Mid  (Erythema/tear)   Elbows Left  (Skin tear/ bruising)   Skin Integumentary   Skin Integumentary (WDL) X    Pressure  Injury Documentation No Pressure Injury Noted-Pressure Ulcer Prevention Initiated  (Toes have black scabs seeing a podiatrist)   Skin Condition/Temp Dry;Fragile; Warm;Other (comment)  (Bruising/tear)   Wound Prevention and Protection Methods   Orientation of Wound Prevention Posterior;Mid   Location of Wound Prevention Sacrum/Coccyx   Dressing Present  Yes

## 2022-02-25 NOTE — PROGRESS NOTES
Problem: Pressure Injury - Risk of  Goal: *Prevention of pressure injury  Description: Document Jose Scale and appropriate interventions in the flowsheet.   Outcome: Progressing Towards Goal  Note: Pressure Injury Interventions:  Sensory Interventions: Assess changes in LOC,Discuss PT/OT consult with provider,Minimize linen layers,Maintain/enhance activity level    Moisture Interventions: Absorbent underpads,Maintain skin hydration (lotion/cream),Minimize layers    Activity Interventions: PT/OT evaluation    Mobility Interventions: PT/OT evaluation,Pressure redistribution bed/mattress (bed type)    Nutrition Interventions: Document food/fluid/supplement intake,Offer support with meals,snacks and hydration

## 2022-02-25 NOTE — PROGRESS NOTES
Problem: Mobility Impaired (Adult and Pediatric)  Goal: *Acute Goals and Plan of Care (Insert Text)  Note: STG:  (1.)Mr. Littlejohn will move from supine to sit and sit to supine  with MINIMAL ASSIST within 7 treatment day(s). (2.)Mr. Littlejohn will transfer from bed to chair and chair to bed with MINIMAL ASSIST using the least restrictive device within 7 treatment day(s). (3.)Mr. Littlejohn will ambulate with MINIMAL ASSIST for 25 feet with the least restrictive device within 7 treatment day(s). (4.)Pt. will increase B LE strength 1/2 grade within 7 days  (5.)Pt. will ambulate up/down ramp with appropriate assistive device and min assist within 7 days      ________________________________________________________________________________________________      PHYSICAL THERAPY: Initial Assessment and PM 2/25/2022  INPATIENT: PT Visit Days : 1  Payor: Leticia Childress / Plan: Veronica Hayfield / Product Type: [employer] /       NAME/AGE/GENDER: Gregg Alas is a 80 y.o. male   PRIMARY DIAGNOSIS: Acute metabolic encephalopathy [H62.25] Acute metabolic encephalopathy Acute metabolic encephalopathy        ICD-10: Treatment Diagnosis:    Generalized Muscle Weakness (M62.81)  Other lack of cordination (R27.8)  Other abnormalities of gait and mobility (R26.89)  History of falling (Z91.81)   Precaution/Allergies:  Penicillins      ASSESSMENT:     Mr. Pedro Plascencia presents with acute metabolic encephalopathy and is experiencing a decline in his premorbid level of function. He is very lethargic today. He would benefit from further PT while here as he is able ozzy participate to address these deficits: decreased general strength and endurance, standing balance and functional mobility. He would benefit from further rehab in [facility]  to maximize his level of function to return home. This afternoon, he is very lethargic. His wife is present and answers my social questions.  He is unable to follow commands for exs, but is able to hold his LEs against gravity. Once he sits on edge of bed, he becomes more alert and opens eyes. He stands but is unable to take steps for me upon my command. He does bear weight through B LEs. He is returned to bed and bed alarm activated. This section established at most recent assessment   PROBLEM LIST (Impairments causing functional limitations):  Decreased Strength  Decreased ADL/Functional Activities  Decreased Transfer Abilities  Decreased Ambulation Ability/Technique  Decreased Balance  Decreased Activity Tolerance  Increased Fatigue  Decreased Flexibility/Joint Mobility  Decreased Iona with Home Exercise Program  Decreased Cognition   INTERVENTIONS PLANNED: (Benefits and precautions of physical therapy have been discussed with the patient.)  Balance Exercise  Bed Mobility  Gait Training  Range of Motion (ROM)  Therapeutic Activites  Therapeutic Exercise/Strengthening  Transfer Training     TREATMENT PLAN: Frequency/Duration: daily for duration of hospital stay  Rehabilitation Potential For Stated Goals: [facility] (at time of discharge pending progress):    Placement: It is my opinion, based on this patient's performance to date, that Mr. Maria Elena Smalls may benefit from intensive therapy at a [address] [place] [address] after discharge due to the functional deficits listed above that are likely to improve with skilled rehabilitation and concerns that he/she may be unsafe to be unsupervised at home due to the need for maximal assistance for functional mobility . Equipment:   Will assess at  a later time              HISTORY:   History of Present Injury/Illness (Reason for Referral):  Sustained a fall. Acute metabolic encephalopathy  Past Medical History/Comorbidities:   Mr. 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 Mr. Margarita oMon  has no past surgical history on file. Social History/Living Environment:   Home Environment: Private residence  # Steps to Enter: 0  Wheelchair Ramp: Yes  One/Two Story Residence: Two story, live on 1st floor  Living Alone: No  Support Systems: Spouse/Significant Other,Other Family Member(s) (gr dtr)  Patient Expects to be Discharged to[de-identified] Rehab Unit Subacute  Current DME Used/Available at Home: Walker, rolling  Tub or Shower Type: Shower  Prior Level of Function/Work/Activity:  Functionally I with bathing, wife assisted with dressing. He ambulated without a device in the home  Dominant Side:         RIGHT   Number of Personal Factors/Comorbidities that affect the Plan of Care: 1-2: MODERATE COMPLEXITY   EXAMINATION:   Most Recent Physical Functioning:   Gross Assessment:  AROM: Generally decreased, functional (B LEs)  Strength: Generally decreased, functional (B LEs appear grossly 3-3+)  Coordination: Generally decreased, functional (B LEs)  Sensation: Intact (B LEs)               Posture:     Balance:  Sitting: High guard  Standing: Pull to stand; With support Bed Mobility:  Rolling: Maximum assistance  Supine to Sit: Maximum assistance  Sit to Supine: Maximum assistance  Scooting: Maximum assistance  Wheelchair Mobility:     Transfers:  Sit to Stand: Maximum assistance  Stand to Sit: Maximum assistance  Stand Pivot Transfers: Maximum assistance  Gait:        Home Environment: Private residence  Home Situation  Home Environment: Private residence  # Steps to Enter: 0  Wheelchair Ramp: Yes  One/Two Story Residence: Two story, live on 1st floor  Living Alone: No  Support Systems: Spouse/Significant Other,Other Family Member(s) (gr dtr)  Patient Expects to be Discharged to[de-identified] Rehab Unit Subacute  Current DME Used/Available at Home: Walker, rolling  Tub or Shower Type: Shower      Body Structures Involved:  Metabolic  Joints  Muscles Body Functions Affected:  Mental  Neuromusculoskeletal  Movement Related  Metobolic/Endocrine Activities and Participation Affected:  Learning and Applying Knowledge  General Tasks and Demands  Mobility  Self Care   Number of elements that affect the Plan of Care: 4+: HIGH COMPLEXITY   CLINICAL PRESENTATION:   Presentation: Evolving clinical presentation with changing clinical characteristics: MODERATE COMPLEXITY   CLINICAL DECISION MAKIN [facility] Inpatient Short Form  How much difficulty does the patient currently have. .. Unable A Lot A Little None   1. Turning over in bed (including adjusting bedclothes, sheets and blankets)? [] 1   [x] 2   [] 3   [] 4   2. Sitting down on and standing up from a chair with arms ( e.g., wheelchair, bedside commode, etc.)   [] 1   [x] 2   [] 3   [] 4   3. Moving from lying on back to sitting on the side of the bed? [] 1   [x] 2   [] 3   [] 4   How much help from another person does the patient currently need. .. Total A Lot A Little None   4. Moving to and from a bed to a chair (including a wheelchair)? [] 1   [x] 2   [] 3   [] 4   5. Need to walk in hospital room? [x] 1   [] 2   [] 3   [] 4   6. Climbing 3-5 steps with a railing? [x] 1   [] 2   [] 3   [] 4   © , Trustees of [address] [place] [address] [address] [address], under license to [employer]. All rights reserved      Score:  Initial: 10 Most Recent: X (Date: -- )    Interpretation of Tool:  Represents activities that are increasingly more difficult (i.e. Bed mobility, Transfers, Gait). Medical Necessity:     Patient demonstrates   fair   rehab potential due to higher previous functional level. Reason for Services/Other Comments:  Patient   continues to require present interventions due to patient's inability to perform functional mobility at a safe minimal assist level  .    Use of outcome tool(s) and clinical judgement create a POC that gives a: Questionable prediction of patient's progress: MODERATE COMPLEXITY            TREATMENT:   (In addition to Assessment/Re-Assessment sessions the following treatments were rendered)   Pre-treatment Symptoms/Complaints:  lethargic  Pain: Initial:   Pain Intensity 1: 0  Post Session:  0     Assessment/Reassessment only, no treatment provided today    Braces/Orthotics/Lines/Etc:   IV  purewick  O2 Device: None (Room air)  Treatment/Session Assessment:    Response to Treatment:  more alert when sitting/standing  Interdisciplinary Collaboration:   Physical Therapist  Registered Nurse  After treatment position/precautions:   Supine in bed  Bed alarm/tab alert on  Bed/Chair-wheels locked  Bed in low position  Call light within reach  RN notified  Family at bedside  Side rails x 3   Compliance with Program/Exercises: Will assess as treatment progresses  Recommendations/Intent for next treatment session: \"Next visit will focus on advancements to more challenging activities and reduction in assistance provided\".   Total Treatment Duration:  PT Patient Time In/Time Out  Time In: 1315  Time Out: [address]

## 2022-02-25 NOTE — PROGRESS NOTES
Care Management Interventions  PCP Verified by CM: Yes (Dr. Loc Gutierrez )  Transition of Care Consult (CM Consult): Discharge Planning  Support Systems: Spouse/Significant Other (Lives with wife & grand-dtr)  Confirm Follow Up Transport: Family  The Patient and/or Patient Representative was Provided with a Choice of Provider and Agrees with the Discharge Plan?: Yes  Freedom of Choice List was Provided with Basic Dialogue that Supports the Patient's Individualized Plan of Care/Goals, Treatment Preferences and Shares the Quality Data Associated with the Providers?: Yes  Discharge Location  Patient Expects to be Discharged to[de-identified] Unable to determine at this time  Visited with pt to establish prior to admission baseline and discuss their anticipated goals at time of d/c. Pt wife/Es Wray and son/Gabriel Jones at bedside. They spoke for pt, for he was sleeping. Pt lives at home with wife and grand-dtr. He was inde with a walker up until 2 weeks ago and then required more assistance to ambulate, per wife. She admits that it has been a \"struggle\" to care for him. He is incont of urine and wife has depends for him, b/c of his inability to get to the bathroom. She asked me to call the dtr/Lucia #[phone] for d/c plans. In speaking with Lucia, she is interested in pt going to [facility] if pt is appropiate. I informed her that therapy has been consulted to see and we will hae a better idea for d/c plans at the time. I have sent a referral to [address] [place] [address] at r's request. Pt/OT/PPD written and CM to follow.

## 2022-02-25 NOTE — PROGRESS NOTES
Problem: Pressure Injury - Risk of  Goal: *Prevention of pressure injury  Description: Document Jose Scale and appropriate interventions in the flowsheet. Outcome: Progressing Towards Goal  Note: Pressure Injury Interventions:  Sensory Interventions: Assess changes in LOC,Discuss PT/OT consult with provider,Minimize linen layers,Maintain/enhance activity level    Moisture Interventions: Absorbent underpads,Maintain skin hydration (lotion/cream),Minimize layers    Activity Interventions: PT/OT evaluation    Mobility Interventions: PT/OT evaluation,Pressure redistribution bed/mattress (bed type)    Nutrition Interventions: Document food/fluid/supplement intake,Offer support with meals,snacks and hydration       Problem: Patient Education: Go to Patient Education Activity  Goal: Patient/Family Education  Outcome: Progressing Towards Goal     Problem: Falls - Risk of  Goal: *Absence of Falls  Description: Document Trevon Fall Risk and appropriate interventions in the flowsheet.   Outcome: Progressing Towards Goal  Note: Fall Risk Interventions:  Mobility Interventions: Mechanical lift    Mentation Interventions: Bed/chair exit alarm,Door open when patient unattended,Reorient patient,Room close to nurse's station    Medication Interventions: Bed/chair exit alarm,Evaluate medications/consider consulting pharmacy,Patient to call before getting OOB,Teach patient to arise slowly    Elimination Interventions: Call light in reach,Patient to call for help with toileting needs    History of Falls Interventions: Room close to nurse's station,Investigate reason for fall         Problem: Patient Education: Go to Patient Education Activity  Goal: Patient/Family Education  Outcome: Progressing Towards Goal

## 2022-02-25 NOTE — PROGRESS NOTES
Hospitalist Progress Note   Admit Date:  2022  2:59 PM   Name:  Simona Maxwell   Age:  80 y.o. Sex:  male  :  6/10/1932   MRN:  [id number]   Room:  [facility]/    Presenting Complaint: Fall    Reason(s) for Admission: Acute metabolic encephalopathy [P42.57]     Hospital Course & Interval History:     Mr. Patti Garza is a 79 yo male with PMH of alzheimer's disease, depression, aflutter who presented with worsening confusion X3-4 days. Pt was found in the floor at home without evidence of trauma. Found to have UTI. Started on rocephin. Urine cx not sent. CT head and cspine without acute findings. CXR showed mildly displaced oblique fx right clavicular diaphysis, no evidence of underlying pneumo. Pelvic xray neg for acute findings. Pt lives with elderly wife and granddaughter who works during the day. 1314 addendum: spoke to ANGEL Vang with Ortho, no intervention for clavicle fx needed. Place sling and f/u outpatient. Subjective/24hr Events (22): Wife at bedside reports pt mentation improved. Baseline mentation answers yes and no to questions. Appears comfortable.      ROS:  Unable to obtain due to alzheimer's     Assessment & Plan:     Principal Problem:    Acute metabolic encephalopathy (3/96/5266)    Likely secondary to UTI  Improving per wife  Baseline mentation is answering \"yes and no\"  Continue abx, IVF      Late onset Alzheimer's disease without behavioral disturbance ([facility] [address] (2020)    Home meds      UTI (urinary tract infection) (2022)  Continue rocephin  Order urine cx    Afib  Home eliquis    Clavicle fx  Consult Ortho          Dispo/Discharge Planning:      eliquis    Diet:  ADULT DIET Regular  DVT PPx: eliquis  Code status: Full Code    Hospital Problems as of 2022 Date Reviewed: 2021          Codes Class Noted - Resolved POA    * (Principal) Acute metabolic encephalopathy [id number]: G93.41  ICD-9-CM: 348.31  2022 - Present Yes        UTI (urinary tract infection) ICD-10-CM: N39.0  ICD-9-CM: 599.0  2/24/2022 - Present Yes        Leukocytosis ICD-10-CM: U76.221  ICD-9-CM: 288.60  2/24/2022 - Present Yes        Normocytic anemia ICD-10-CM: D64.9  ICD-9-CM: 285.9  2/24/2022 - Present Yes        Late onset Alzheimer's disease without behavioral disturbance (HCC) ICD-10-CM: G30.1, F02.80  ICD-9-CM: 331.0, 294.10  8/4/2020 - Present Yes        Peripheral vascular disease ([facility] [address] ICD-10-CM: I73.9  ICD-9-CM: 443.9  6/14/2020 - Present Yes              Objective:     Patient Vitals for the past 24 hrs:   Temp Pulse Resp BP SpO2   02/25/22 1140 97.4 °F (36.3 °C) 60 18 (!) 167/84 99 %   02/25/22 0732 97.7 °F (36.5 °C) (!) 59 20 128/65 96 %   02/25/22 0237 97.9 °F (36.6 °C) (!) 59 20 (!) 175/74 96 %   02/24/22 2140 97.6 °F (36.4 °C) 61 22 (!) 171/82 99 %   02/24/22 1850 -- 60 (!) 34 (!) 172/77 98 %   02/24/22 1501 97.4 °F (36.3 °C) 60 16 (!) 166/75 99 %     Oxygen Therapy  O2 Sat (%): 99 % (02/25/22 1140)  Pulse via Oximetry: 60 beats per minute (02/24/22 1850)  O2 Device: None (Room air) (02/24/22 2140)    Estimated body mass index is 20.19 kg/m² as calculated from the following:    Height as of this encounter: 6' (1.829 m). Weight as of this encounter: 67.5 kg (148 lb 14.4 oz). Intake/Output Summary (Last 24 hours) at 2/25/2022 1211  Last data filed at 2/25/2022 0850  Gross per 24 hour   Intake 1065 ml   Output 350 ml   Net 715 ml         Physical Exam:     Blood pressure (!) 167/84, pulse 60, temperature 97.4 °F (36.3 °C), resp. rate 18, height 6' (1.829 m), weight 67.5 kg (148 lb 14.4 oz), SpO2 99 %. General:    Well nourished. No overt distress. Appears chronically ill. Answers yes and no only  Head:  Normocephalic, atraumatic  Eyes:  Sclerae appear normal.  Pupils equally round. ENT:  Nares appear normal, no drainage. Moist oral mucosa  Neck:  No restricted ROM. Trachea midline   CV:   RRR. No m/r/g. No jugular venous distension.   Lungs: CTAB.  No wheezing, rhonchi, or rales. Respirations even, unlabored. Right clavicular deformity palpated. Abdomen: Bowel sounds present. Soft, non-tender (no grimacing with palpation), nondistended. Extremities: No cyanosis or clubbing. No edema. Rigid LE with ROM. Skin:     No rashes and normal coloration. Warm and dry. Neuro:  CN II-XII grossly intact. Sensation intact. Alert, only answers yes or no but not appropriately  Psych:  Normal mood and affect. I have reviewed ordered lab tests and independently visualized imaging below:    Recent Labs:  Recent Results (from the past 48 hour(s))   CULTURE, BLOOD    Collection Time: 02/24/22  3:26 PM    Specimen: Blood   Result Value Ref Range    Special Requests: LEFT ANTECUBITAL      Culture result: NO GROWTH AFTER 15 HOURS     CBC WITH AUTOMATED DIFF    Collection Time: 02/24/22  3:27 PM   Result Value Ref Range    WBC 11.6 (H) 4.3 - 11.1 K/uL    RBC 4.46 4.23 - 5.6 M/uL    HGB 11.8 (L) 13.6 - 17.2 g/dL    HCT 38.8 (L) 41.1 - 50.3 %    MCV 87.0 79.6 - 97.8 FL    MCH 26.5 26.1 - 32.9 PG    MCHC 30.4 (L) 31.4 - 35.0 g/dL    RDW 15.5 (H) 11.9 - 14.6 %    PLATELET 124 118 - 666 K/uL    MPV 11.2 9.4 - 12.3 FL    ABSOLUTE NRBC 0.00 0.0 - 0.2 K/uL    DF AUTOMATED      NEUTROPHILS 79 (H) 43 - 78 %    LYMPHOCYTES 11 (L) 13 - 44 %    MONOCYTES 6 4.0 - 12.0 %    EOSINOPHILS 4 0.5 - 7.8 %    BASOPHILS 1 0.0 - 2.0 %    IMMATURE GRANULOCYTES 0 0.0 - 5.0 %    ABS. NEUTROPHILS 9.1 (H) 1.7 - 8.2 K/UL    ABS. LYMPHOCYTES 1.2 0.5 - 4.6 K/UL    ABS. MONOCYTES 0.7 0.1 - 1.3 K/UL    ABS. EOSINOPHILS 0.5 0.0 - 0.8 K/UL    ABS. BASOPHILS 0.1 0.0 - 0.2 K/UL    ABS. IMM.  GRANS. 0.0 0.0 - 0.5 K/UL   METABOLIC PANEL, COMPREHENSIVE    Collection Time: 02/24/22  3:27 PM   Result Value Ref Range    Sodium 143 136 - 145 mmol/L    Potassium 3.9 3.5 - 5.1 mmol/L    Chloride 110 (H) 98 - 107 mmol/L    CO2 30 21 - 32 mmol/L    Anion gap 3 (L) 7 - 16 mmol/L    Glucose 85 65 - 100 mg/dL    BUN 30 (H) 8 - 23 MG/DL    Creatinine 1.21 0.8 - 1.5 MG/DL    GFR est AA >60 >60 ml/min/1.73m2    GFR est non-AA >60 >60 ml/min/1.73m2    Calcium 9.1 8.3 - 10.4 MG/DL    Bilirubin, total 0.5 0.2 - 1.1 MG/DL    ALT (SGPT) 18 12 - 65 U/L    AST (SGOT) 25 15 - 37 U/L    Alk.  phosphatase 94 50 - 136 U/L    Protein, total 6.6 6.3 - 8.2 g/dL    Albumin 3.4 3.2 - 4.6 g/dL    Globulin 3.2 2.3 - 3.5 g/dL    A-G Ratio 1.1 (L) 1.2 - 3.5     TSH 3RD GENERATION    Collection Time: 02/24/22  3:27 PM   Result Value Ref Range    TSH 1.020 uIU/mL   LACTIC ACID    Collection Time: 02/24/22  3:27 PM   Result Value Ref Range    Lactic acid 0.6 0.4 - 2.0 MMOL/L   LACTIC ACID    Collection Time: 02/24/22  6:50 PM   Result Value Ref Range    Lactic acid 0.7 0.4 - 2.0 MMOL/L   COVID-19 RAPID TEST    Collection Time: 02/24/22  6:50 PM   Result Value Ref Range    Specimen source Nasopharyngeal      COVID-19 rapid test Not detected NOTD     URINALYSIS W/ RFLX MICROSCOPIC    Collection Time: 02/24/22  7:49 PM   Result Value Ref Range    Color YELLOW      Appearance CLEAR      Specific gravity 1.008 1.001 - 1.023      pH (UA) 6.0 5.0 - 9.0      Protein Negative NEG mg/dL    Glucose Negative mg/dL    Ketone Negative NEG mg/dL    Bilirubin Negative NEG      Blood LARGE (A) NEG      Urobilinogen 0.2 0.2 - 1.0 EU/dL    Nitrites Negative NEG      Leukocyte Esterase SMALL (A) NEG      WBC 10-20 0 /hpf    RBC >100 (H) 0 /hpf    Epithelial cells 0-3 0 /hpf    Bacteria 0 0 /hpf    Casts 0 0 /lpf   METABOLIC PANEL, COMPREHENSIVE    Collection Time: 02/25/22  6:53 AM   Result Value Ref Range    Sodium 143 136 - 145 mmol/L    Potassium 3.8 3.5 - 5.1 mmol/L    Chloride 111 (H) 98 - 107 mmol/L    CO2 27 21 - 32 mmol/L    Anion gap 5 (L) 7 - 16 mmol/L    Glucose 74 65 - 100 mg/dL    BUN 27 (H) 8 - 23 MG/DL    Creatinine 1.04 0.8 - 1.5 MG/DL    GFR est AA >60 >60 ml/min/1.73m2    GFR est non-AA >60 >60 ml/min/1.73m2    Calcium 8.7 8.3 - 10.4 MG/DL Bilirubin, total 0.4 0.2 - 1.1 MG/DL    ALT (SGPT) 16 12 - 65 U/L    AST (SGOT) 22 15 - 37 U/L    Alk. phosphatase 85 50 - 136 U/L    Protein, total 5.9 (L) 6.3 - 8.2 g/dL    Albumin 2.8 (L) 3.2 - 4.6 g/dL    Globulin 3.1 2.3 - 3.5 g/dL    A-G Ratio 0.9 (L) 1.2 - 3.5     CBC WITH AUTOMATED DIFF    Collection Time: 02/25/22  6:53 AM   Result Value Ref Range    WBC 9.2 4.3 - 11.1 K/uL    RBC 4.25 4.23 - 5.6 M/uL    HGB 11.4 (L) 13.6 - 17.2 g/dL    HCT 36.5 (L) 41.1 - 50.3 %    MCV 85.9 79.6 - 97.8 FL    MCH 26.8 26.1 - 32.9 PG    MCHC 31.2 (L) 31.4 - 35.0 g/dL    RDW 15.6 (H) 11.9 - 14.6 %    PLATELET 889 666 - 100 K/uL    MPV 10.3 9.4 - 12.3 FL    ABSOLUTE NRBC 0.00 0.0 - 0.2 K/uL    DF AUTOMATED      NEUTROPHILS 75 43 - 78 %    LYMPHOCYTES 12 (L) 13 - 44 %    MONOCYTES 7 4.0 - 12.0 %    EOSINOPHILS 6 0.5 - 7.8 %    BASOPHILS 0 0.0 - 2.0 %    IMMATURE GRANULOCYTES 0 0.0 - 5.0 %    ABS. NEUTROPHILS 6.8 1.7 - 8.2 K/UL    ABS. LYMPHOCYTES 1.1 0.5 - 4.6 K/UL    ABS. MONOCYTES 0.6 0.1 - 1.3 K/UL    ABS. EOSINOPHILS 0.6 0.0 - 0.8 K/UL    ABS. BASOPHILS 0.0 0.0 - 0.2 K/UL    ABS. IMM. GRANS. 0.0 0.0 - 0.5 K/UL       All Micro Results     Procedure Component Value Units Date/Time    CULTURE, BLOOD [[id number]] Collected: 02/24/22 1526    Order Status: Completed Specimen: Blood Updated: 02/25/22 0800     Special Requests: LEFT ANTECUBITAL        Culture result: NO GROWTH AFTER 15 HOURS       COVID-19 RAPID TEST [[id number]] Collected: 02/24/22 1850    Order Status: Completed Specimen: Nasopharyngeal Updated: 02/24/22 1918     Specimen source Nasopharyngeal        COVID-19 rapid test Not detected        Comment:      The specimen is NEGATIVE for SARS-CoV-2, the novel coronavirus associated with COVID-19. A negative result does not rule out COVID-19. This test has been authorized by the FDA under an Emergency Use Authorization (EUA) for use by authorized laboratories.         Fact sheet for Healthcare Providers: ConventionUpdate.co.nz  Fact sheet for Patients: ConventionUpdate.co.nz       Methodology: Isothermal Nucleic Acid Amplification               Other Studies:  XR PELV AP ONLY    Result Date: 2/24/2022  Pelvis INDICATION: Leg pain after fall A supine view of the pelvis was obtained. There is no evidence of hip fracture or dislocation. Bony pelvis also appears intact. There is moderate vascular calcification. No acute findings    CT HEAD WO CONT    Result Date: 2/24/2022  CT of the Head and Cervical Spine INDICATION:  Fall, altered mental status Multiple axial images obtained through the brain without intravenous contrast. High resolution axial images were then obtained through the cervical spine. Coronal and sagittal reformats were also evaluated. Radiation dose reduction techniques were used for this study: All CT scans performed at this facility use one or all of the following: Automated exposure control, adjustment of the mA and/or kVp according to patient's size, iterative reconstruction. Head CT:  No areas of abnormal attenuation are seen in the brain. There is no CT evidence of acute hemorrhage or infarction. The ventricles are normal in size. There are no extra-axial fluid collections. No masses are seen. There are no bony lesions. Cervical Spine CT: There is mild multilevel degenerative disc disease. There is no evidence of fracture or subluxation. No bony lesions are seen. There is no prevertebral soft tissue swelling. There is bilateral carotid atherosclerosis. 1.  No CT evidence of acute intracranial abnormality. 2.  No evidence of cervical spine fracture      CT SPINE CERV WO CONT    Result Date: 2/24/2022  CT of the Head and Cervical Spine INDICATION:  Fall, altered mental status Multiple axial images obtained through the brain without intravenous contrast. High resolution axial images were then obtained through the cervical spine.  Coronal and sagittal reformats were also evaluated. Radiation dose reduction techniques were used for this study: All CT scans performed at this facility use one or all of the following: Automated exposure control, adjustment of the mA and/or kVp according to patient's size, iterative reconstruction. Head CT:  No areas of abnormal attenuation are seen in the brain. There is no CT evidence of acute hemorrhage or infarction. The ventricles are normal in size. There are no extra-axial fluid collections. No masses are seen. There are no bony lesions. Cervical Spine CT: There is mild multilevel degenerative disc disease. There is no evidence of fracture or subluxation. No bony lesions are seen. There is no prevertebral soft tissue swelling. There is bilateral carotid atherosclerosis. 1.  No CT evidence of acute intracranial abnormality. 2.  No evidence of cervical spine fracture      XR CHEST PORT    Result Date: 2/24/2022  EXAMINATION: CHEST RADIOGRAPH 2/24/2022 3:27 PM ACCESSION NUMBER: [id number] INDICATION: AMS/confusion COMPARISON: Chest x-ray 9/12/2007. TECHNIQUE: A single view of the chest was obtained. 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 Pacemaker with right atrial and right ventricular leads. Lungs: Minimal linear opacity along the left heart border. Pleura: No pleural effusion. No pneumothorax. Osseous Structures: Mildly displaced right clavicular fracture Upper Abdomen: Unremarkable. 1. There is a mildly displaced oblique fracture of the right clavicular diaphysis. There is no evidence of underlying pneumothorax. 2. Trace atelectasis along the left heart border. 3. No evidence of a focal pneumonia.        Current Meds:  Current Facility-Administered Medications   Medication Dose Route Frequency    tuberculin injection 5 Units  5 Units IntraDERMal ONCE    apixaban (ELIQUIS) tablet 5 mg  5 mg Oral BID    aspirin delayed-release tablet 81 mg  81 mg Oral DAILY    donepeziL (ARICEPT) tablet 10 mg  10 mg Oral DAILY    nitroglycerin (NITROSTAT) tablet 0.4 mg  0.4 mg SubLINGual PRN    isosorbide mononitrate ER (IMDUR) tablet 30 mg  30 mg Oral QHS    memantine (NAMENDA) tablet 15 mg  15 mg Oral QHS    sertraline (ZOLOFT) tablet 50 mg  50 mg Oral QHS    atorvastatin (LIPITOR) tablet 20 mg  20 mg Oral QHS    sodium chloride (NS) flush 5-40 mL  5-40 mL IntraVENous Q8H    sodium chloride (NS) flush 5-40 mL  5-40 mL IntraVENous PRN    acetaminophen (TYLENOL) tablet 650 mg  650 mg Oral Q6H PRN    Or    acetaminophen (TYLENOL) suppository 650 mg  650 mg Rectal Q6H PRN    polyethylene glycol (MIRALAX) packet 17 g  17 g Oral DAILY PRN    ondansetron (ZOFRAN ODT) tablet 4 mg  4 mg Oral Q8H PRN    Or    ondansetron (ZOFRAN) injection 4 mg  4 mg IntraVENous Q6H PRN    0.9% sodium chloride infusion  100 mL/hr IntraVENous CONTINUOUS    cefTRIAXone (ROCEPHIN) 1 g in 0.9% sodium chloride (MBP/ADV) 50 mL MBP  1 g IntraVENous Q24H       Signed:  Danette Young NP    Notes, labs, VS, diagnostic testing reviewed  Case discussed with care team, Dr. Yissel Mosquera, wife at bedside

## 2022-02-26 LAB
ALBUMIN SERPL-MCNC: 2.7 G/DL (ref 3.2–4.6)
ALBUMIN/GLOB SERPL: 0.9 {RATIO} (ref 1.2–3.5)
ALP SERPL-CCNC: 80 U/L (ref 50–136)
ALT SERPL-CCNC: 14 U/L (ref 12–65)
ANION GAP SERPL CALC-SCNC: 1 MMOL/L (ref 7–16)
AST SERPL-CCNC: 16 U/L (ref 15–37)
BASOPHILS # BLD: 0.1 K/UL (ref 0–0.2)
BASOPHILS NFR BLD: 1 % (ref 0–2)
BILIRUB SERPL-MCNC: 0.4 MG/DL (ref 0.2–1.1)
BUN SERPL-MCNC: 24 MG/DL (ref 8–23)
CALCIUM SERPL-MCNC: 9 MG/DL (ref 8.3–10.4)
CHLORIDE SERPL-SCNC: 109 MMOL/L (ref 98–107)
CO2 SERPL-SCNC: 30 MMOL/L (ref 21–32)
CREAT SERPL-MCNC: 1.01 MG/DL (ref 0.8–1.5)
DIFFERENTIAL METHOD BLD: ABNORMAL
EOSINOPHIL # BLD: 0.6 K/UL (ref 0–0.8)
EOSINOPHIL NFR BLD: 6 % (ref 0.5–7.8)
ERYTHROCYTE [DISTWIDTH] IN BLOOD BY AUTOMATED COUNT: 15.3 % (ref 11.9–14.6)
GLOBULIN SER CALC-MCNC: 3.1 G/DL (ref 2.3–3.5)
GLUCOSE SERPL-MCNC: 88 MG/DL (ref 65–100)
HCT VFR BLD AUTO: 36.3 % (ref 41.1–50.3)
HGB BLD-MCNC: 11.2 G/DL (ref 13.6–17.2)
IMM GRANULOCYTES # BLD AUTO: 0 K/UL (ref 0–0.5)
IMM GRANULOCYTES NFR BLD AUTO: 0 % (ref 0–5)
LYMPHOCYTES # BLD: 1.2 K/UL (ref 0.5–4.6)
LYMPHOCYTES NFR BLD: 12 % (ref 13–44)
MCH RBC QN AUTO: 26.7 PG (ref 26.1–32.9)
MCHC RBC AUTO-ENTMCNC: 30.9 G/DL (ref 31.4–35)
MCV RBC AUTO: 86.4 FL (ref 79.6–97.8)
MM INDURATION POC: 0 MM (ref 0–5)
MONOCYTES # BLD: 0.6 K/UL (ref 0.1–1.3)
MONOCYTES NFR BLD: 6 % (ref 4–12)
NEUTS SEG # BLD: 7.7 K/UL (ref 1.7–8.2)
NEUTS SEG NFR BLD: 75 % (ref 43–78)
NRBC # BLD: 0 K/UL (ref 0–0.2)
PLATELET # BLD AUTO: 204 K/UL (ref 150–450)
PMV BLD AUTO: 10.7 FL (ref 9.4–12.3)
POTASSIUM SERPL-SCNC: 3.7 MMOL/L (ref 3.5–5.1)
PPD POC: NEGATIVE NEGATIVE
PROT SERPL-MCNC: 5.8 G/DL (ref 6.3–8.2)
RBC # BLD AUTO: 4.2 M/UL (ref 4.23–5.6)
SODIUM SERPL-SCNC: 140 MMOL/L (ref 136–145)
WBC # BLD AUTO: 10.2 K/UL (ref 4.3–11.1)

## 2022-02-26 PROCEDURE — 74011250637 HC RX REV CODE- 250/637: Performed by: FAMILY MEDICINE

## 2022-02-26 PROCEDURE — 74011250636 HC RX REV CODE- 250/636: Performed by: FAMILY MEDICINE

## 2022-02-26 PROCEDURE — 97166 OT EVAL MOD COMPLEX 45 MIN: CPT

## 2022-02-26 PROCEDURE — 97530 THERAPEUTIC ACTIVITIES: CPT

## 2022-02-26 PROCEDURE — 74011000258 HC RX REV CODE- 258: Performed by: FAMILY MEDICINE

## 2022-02-26 PROCEDURE — 36415 COLL VENOUS BLD VENIPUNCTURE: CPT

## 2022-02-26 PROCEDURE — 80053 COMPREHEN METABOLIC PANEL: CPT

## 2022-02-26 PROCEDURE — 74011000250 HC RX REV CODE- 250: Performed by: FAMILY MEDICINE

## 2022-02-26 PROCEDURE — 85025 COMPLETE CBC W/AUTO DIFF WBC: CPT

## 2022-02-26 PROCEDURE — 65270000029 HC RM PRIVATE

## 2022-02-26 PROCEDURE — 97535 SELF CARE MNGMENT TRAINING: CPT

## 2022-02-26 RX ORDER — AMLODIPINE BESYLATE 5 MG/1
5 TABLET ORAL DAILY
Status: DISCONTINUED | OUTPATIENT
Start: 2022-02-27 | End: 2022-02-27

## 2022-02-26 RX ADMIN — Medication 81 MG: at 08:29

## 2022-02-26 RX ADMIN — SODIUM CHLORIDE, PRESERVATIVE FREE 10 ML: 5 INJECTION INTRAVENOUS at 05:33

## 2022-02-26 RX ADMIN — SODIUM CHLORIDE 100 ML/HR: 900 INJECTION, SOLUTION INTRAVENOUS at 16:51

## 2022-02-26 RX ADMIN — ISOSORBIDE MONONITRATE 30 MG: 30 TABLET, EXTENDED RELEASE ORAL at 21:02

## 2022-02-26 RX ADMIN — CEFTRIAXONE 1 G: 1 INJECTION, POWDER, FOR SOLUTION INTRAMUSCULAR; INTRAVENOUS at 22:53

## 2022-02-26 RX ADMIN — APIXABAN 5 MG: 5 TABLET, FILM COATED ORAL at 08:29

## 2022-02-26 RX ADMIN — ATORVASTATIN CALCIUM 20 MG: 10 TABLET, FILM COATED ORAL at 21:02

## 2022-02-26 RX ADMIN — SODIUM CHLORIDE, PRESERVATIVE FREE 10 ML: 5 INJECTION INTRAVENOUS at 21:03

## 2022-02-26 RX ADMIN — APIXABAN 5 MG: 5 TABLET, FILM COATED ORAL at 21:02

## 2022-02-26 RX ADMIN — SODIUM CHLORIDE, PRESERVATIVE FREE 10 ML: 5 INJECTION INTRAVENOUS at 14:10

## 2022-02-26 RX ADMIN — DONEPEZIL HYDROCHLORIDE 10 MG: 5 TABLET, FILM COATED ORAL at 08:29

## 2022-02-26 RX ADMIN — MEMANTINE 15 MG: 5 TABLET ORAL at 21:03

## 2022-02-26 RX ADMIN — SERTRALINE 50 MG: 50 TABLET, FILM COATED ORAL at 21:02

## 2022-02-26 NOTE — PROGRESS NOTES
Hospitalist Progress Note   Admit Date:  2022  2:59 PM   Name:  Autumn Florian   Age:  80 y.o. Sex:  male  :  6/10/1932   MRN:  [id number]   Room:  [facility]    Presenting Complaint: Fall    Reason(s) for Admission: Acute metabolic encephalopathy [B58.87]     Hospital Course & Interval History:   Mr. Bonnie Wyatt is a 79 yo male with PMH of alzheimer's disease, depression, aflutter who presented with worsening confusion X3-4 days. Pt was found in the floor at home without evidence of trauma. Found to have UTI. Started on rocephin. Urine cx not sent. CT head and cspine without acute findings. CXR showed mildly displaced oblique fx right clavicular diaphysis, no evidence of underlying pneumo. Ortho consulted, no intervention. Pelvic xray neg for acute findings. Pt lives with elderly wife and granddaughter who works during the day. Subjective/24hr Events (22): Had episode of combativeness this morning. Wife was able to calm him down.      ROS:  Unable to obtain due to mentation    Assessment & Plan:   Acute metabolic encephalopathy (3/87/1683)    Likely secondary to UTI  Improving per wife  Baseline mentation is answering \"yes and no\"  Continue abx, IVF   had episode of combativeness this morning, wife was able to settle him down  Encouraged blinds open, lights on, awake during the day, sleep at night, PT/OT involved  Would like to avoid pharmacological intervention if possible.        Late onset Alzheimer's disease without behavioral disturbance ([facility] [address] (2020)    Home meds       UTI (urinary tract infection) (2022)  Continue rocephin  Order urine cx   NGTD     Afib  Home eliquis     Clavicle fx  Consult Ortho  No intervention per Ortho  Sling to RUE    HTN   Start norvasc             Dispo/Discharge Planning:      eliquis     Diet:  ADULT DIET Regular  DVT PPx: eliquis  Code status: Full Code      Hospital Problems as of 2022 Date Reviewed: 2021          Codes Class Noted - Resolved POA    * (Principal) Acute metabolic encephalopathy PYN-62-ER: G93.41  ICD-9-CM: 348.31  2/24/2022 - Present Yes        UTI (urinary tract infection) ICD-10-CM: N39.0  ICD-9-CM: 599.0  2/24/2022 - Present Yes        Leukocytosis ICD-10-CM: T93.008  ICD-9-CM: 288.60  2/24/2022 - Present Yes        Normocytic anemia ICD-10-CM: D64.9  ICD-9-CM: 285.9  2/24/2022 - Present Yes        Late onset Alzheimer's disease without behavioral disturbance (HCC) ICD-10-CM: G30.1, F02.80  ICD-9-CM: 331.0, 294.10  8/4/2020 - Present Yes        Peripheral vascular disease ([facility] [address] ICD-10-CM: I73.9  ICD-9-CM: 443.9  6/14/2020 - Present Yes              Objective:     Patient Vitals for the past 24 hrs:   Temp Pulse Resp BP SpO2   02/26/22 1115 98 °F (36.7 °C) 60 18 (!) 173/69 97 %   02/26/22 0817 98.1 °F (36.7 °C) 60 18 (!) 174/72 96 %   02/26/22 0410 98.1 °F (36.7 °C) 60 14 (!) 166/69 97 %   02/25/22 2320 98.6 °F (37 °C) 60 20 (!) 152/64 96 %   02/25/22 2016 98.7 °F (37.1 °C) 64 20 (!) 150/66 97 %   02/25/22 1505 97.8 °F (36.6 °C) 65 16 (!) 148/89 96 %     Oxygen Therapy  O2 Sat (%): 97 % (02/26/22 1115)  Pulse via Oximetry: 60 beats per minute (02/24/22 1850)  O2 Device: None (Room air) (02/26/22 0710)    Estimated body mass index is 20.19 kg/m² as calculated from the following:    Height as of this encounter: 6' (1.829 m). Weight as of this encounter: 67.5 kg (148 lb 14.4 oz). Intake/Output Summary (Last 24 hours) at 2/26/2022 1202  Last data filed at 2/26/2022 1201  Gross per 24 hour   Intake 480 ml   Output 2975 ml   Net -2495 ml         Physical Exam:     Blood pressure (!) 173/69, pulse 60, temperature 98 °F (36.7 °C), resp. rate 18, height 6' (1.829 m), weight 67.5 kg (148 lb 14.4 oz), SpO2 97 %. General:          Well nourished. No overt distress.  Appears chronically ill.  nonverbal this morning  Head:               Normocephalic, atraumatic  Eyes:               Sclerae appear normal.  Pupils equally round.  ENT:                Nares appear normal, no drainage. Moist oral mucosa  Neck:               No restricted ROM. Trachea midline   CV:                  RRR. No m/r/g. No jugular venous distension. Lungs:             CTAB. No wheezing, rhonchi, or rales. Respirations even, unlabored. Right clavicular deformity palpated. Abdomen: Bowel sounds present. Soft, non-tender (no grimacing with palpation), nondistended. Extremities:     No cyanosis or clubbing. No edema. Rigid LE with ROM. Skin:                No rashes and normal coloration. Warm and dry. Neuro:             CN II-XII grossly intact. Sensation intact. Alert, eyes open, non verbal this morning   Psych:             Normal mood and affect. I have reviewed ordered lab tests and independently visualized imaging below:    Recent Labs:  Recent Results (from the past 48 hour(s))   CULTURE, BLOOD    Collection Time: 02/24/22  3:26 PM    Specimen: Blood   Result Value Ref Range    Special Requests: LEFT ANTECUBITAL      Culture result: NO GROWTH 2 DAYS     CBC WITH AUTOMATED DIFF    Collection Time: 02/24/22  3:27 PM   Result Value Ref Range    WBC 11.6 (H) 4.3 - 11.1 K/uL    RBC 4.46 4.23 - 5.6 M/uL    HGB 11.8 (L) 13.6 - 17.2 g/dL    HCT 38.8 (L) 41.1 - 50.3 %    MCV 87.0 79.6 - 97.8 FL    MCH 26.5 26.1 - 32.9 PG    MCHC 30.4 (L) 31.4 - 35.0 g/dL    RDW 15.5 (H) 11.9 - 14.6 %    PLATELET 416 915 - 218 K/uL    MPV 11.2 9.4 - 12.3 FL    ABSOLUTE NRBC 0.00 0.0 - 0.2 K/uL    DF AUTOMATED      NEUTROPHILS 79 (H) 43 - 78 %    LYMPHOCYTES 11 (L) 13 - 44 %    MONOCYTES 6 4.0 - 12.0 %    EOSINOPHILS 4 0.5 - 7.8 %    BASOPHILS 1 0.0 - 2.0 %    IMMATURE GRANULOCYTES 0 0.0 - 5.0 %    ABS. NEUTROPHILS 9.1 (H) 1.7 - 8.2 K/UL    ABS. LYMPHOCYTES 1.2 0.5 - 4.6 K/UL    ABS. MONOCYTES 0.7 0.1 - 1.3 K/UL    ABS. EOSINOPHILS 0.5 0.0 - 0.8 K/UL    ABS. BASOPHILS 0.1 0.0 - 0.2 K/UL    ABS. IMM.  GRANS. 0.0 0.0 - 0.5 K/UL   METABOLIC PANEL, COMPREHENSIVE    Collection Time: 02/24/22  3:27 PM   Result Value Ref Range    Sodium 143 136 - 145 mmol/L    Potassium 3.9 3.5 - 5.1 mmol/L    Chloride 110 (H) 98 - 107 mmol/L    CO2 30 21 - 32 mmol/L    Anion gap 3 (L) 7 - 16 mmol/L    Glucose 85 65 - 100 mg/dL    BUN 30 (H) 8 - 23 MG/DL    Creatinine 1.21 0.8 - 1.5 MG/DL    GFR est AA >60 >60 ml/min/1.73m2    GFR est non-AA >60 >60 ml/min/1.73m2    Calcium 9.1 8.3 - 10.4 MG/DL    Bilirubin, total 0.5 0.2 - 1.1 MG/DL    ALT (SGPT) 18 12 - 65 U/L    AST (SGOT) 25 15 - 37 U/L    Alk.  phosphatase 94 50 - 136 U/L    Protein, total 6.6 6.3 - 8.2 g/dL    Albumin 3.4 3.2 - 4.6 g/dL    Globulin 3.2 2.3 - 3.5 g/dL    A-G Ratio 1.1 (L) 1.2 - 3.5     TSH 3RD GENERATION    Collection Time: 02/24/22  3:27 PM   Result Value Ref Range    TSH 1.020 uIU/mL   LACTIC ACID    Collection Time: 02/24/22  3:27 PM   Result Value Ref Range    Lactic acid 0.6 0.4 - 2.0 MMOL/L   LACTIC ACID    Collection Time: 02/24/22  6:50 PM   Result Value Ref Range    Lactic acid 0.7 0.4 - 2.0 MMOL/L   COVID-19 RAPID TEST    Collection Time: 02/24/22  6:50 PM   Result Value Ref Range    Specimen source Nasopharyngeal      COVID-19 rapid test Not detected NOTD     URINALYSIS W/ RFLX MICROSCOPIC    Collection Time: 02/24/22  7:49 PM   Result Value Ref Range    Color YELLOW      Appearance CLEAR      Specific gravity 1.008 1.001 - 1.023      pH (UA) 6.0 5.0 - 9.0      Protein Negative NEG mg/dL    Glucose Negative mg/dL    Ketone Negative NEG mg/dL    Bilirubin Negative NEG      Blood LARGE (A) NEG      Urobilinogen 0.2 0.2 - 1.0 EU/dL    Nitrites Negative NEG      Leukocyte Esterase SMALL (A) NEG      WBC 10-20 0 /hpf    RBC >100 (H) 0 /hpf    Epithelial cells 0-3 0 /hpf    Bacteria 0 0 /hpf    Casts 0 0 /lpf   CULTURE, URINE    Collection Time: 02/24/22  7:49 PM    Specimen: Clean catch; Urine   Result Value Ref Range    Special Requests: NO SPECIAL REQUESTS      Culture result:        NO GROWTH AFTER SHORT PERIOD OF INCUBATION. FURTHER RESULTS TO FOLLOW AFTER OVERNIGHT INCUBATION. METABOLIC PANEL, COMPREHENSIVE    Collection Time: 02/25/22  6:53 AM   Result Value Ref Range    Sodium 143 136 - 145 mmol/L    Potassium 3.8 3.5 - 5.1 mmol/L    Chloride 111 (H) 98 - 107 mmol/L    CO2 27 21 - 32 mmol/L    Anion gap 5 (L) 7 - 16 mmol/L    Glucose 74 65 - 100 mg/dL    BUN 27 (H) 8 - 23 MG/DL    Creatinine 1.04 0.8 - 1.5 MG/DL    GFR est AA >60 >60 ml/min/1.73m2    GFR est non-AA >60 >60 ml/min/1.73m2    Calcium 8.7 8.3 - 10.4 MG/DL    Bilirubin, total 0.4 0.2 - 1.1 MG/DL    ALT (SGPT) 16 12 - 65 U/L    AST (SGOT) 22 15 - 37 U/L    Alk. phosphatase 85 50 - 136 U/L    Protein, total 5.9 (L) 6.3 - 8.2 g/dL    Albumin 2.8 (L) 3.2 - 4.6 g/dL    Globulin 3.1 2.3 - 3.5 g/dL    A-G Ratio 0.9 (L) 1.2 - 3.5     CBC WITH AUTOMATED DIFF    Collection Time: 02/25/22  6:53 AM   Result Value Ref Range    WBC 9.2 4.3 - 11.1 K/uL    RBC 4.25 4.23 - 5.6 M/uL    HGB 11.4 (L) 13.6 - 17.2 g/dL    HCT 36.5 (L) 41.1 - 50.3 %    MCV 85.9 79.6 - 97.8 FL    MCH 26.8 26.1 - 32.9 PG    MCHC 31.2 (L) 31.4 - 35.0 g/dL    RDW 15.6 (H) 11.9 - 14.6 %    PLATELET 456 848 - 019 K/uL    MPV 10.3 9.4 - 12.3 FL    ABSOLUTE NRBC 0.00 0.0 - 0.2 K/uL    DF AUTOMATED      NEUTROPHILS 75 43 - 78 %    LYMPHOCYTES 12 (L) 13 - 44 %    MONOCYTES 7 4.0 - 12.0 %    EOSINOPHILS 6 0.5 - 7.8 %    BASOPHILS 0 0.0 - 2.0 %    IMMATURE GRANULOCYTES 0 0.0 - 5.0 %    ABS. NEUTROPHILS 6.8 1.7 - 8.2 K/UL    ABS. LYMPHOCYTES 1.1 0.5 - 4.6 K/UL    ABS. MONOCYTES 0.6 0.1 - 1.3 K/UL    ABS. EOSINOPHILS 0.6 0.0 - 0.8 K/UL    ABS. BASOPHILS 0.0 0.0 - 0.2 K/UL    ABS. IMM.  GRANS. 0.0 0.0 - 0.5 K/UL   METABOLIC PANEL, COMPREHENSIVE    Collection Time: 02/26/22  5:49 AM   Result Value Ref Range    Sodium 140 136 - 145 mmol/L    Potassium 3.7 3.5 - 5.1 mmol/L    Chloride 109 (H) 98 - 107 mmol/L    CO2 30 21 - 32 mmol/L    Anion gap 1 (L) 7 - 16 mmol/L    Glucose 88 65 - 100 mg/dL    BUN 24 (H) 8 - 23 MG/DL    Creatinine 1.01 0.8 - 1.5 MG/DL    GFR est AA >60 >60 ml/min/1.73m2    GFR est non-AA >60 >60 ml/min/1.73m2    Calcium 9.0 8.3 - 10.4 MG/DL    Bilirubin, total 0.4 0.2 - 1.1 MG/DL    ALT (SGPT) 14 12 - 65 U/L    AST (SGOT) 16 15 - 37 U/L    Alk. phosphatase 80 50 - 136 U/L    Protein, total 5.8 (L) 6.3 - 8.2 g/dL    Albumin 2.7 (L) 3.2 - 4.6 g/dL    Globulin 3.1 2.3 - 3.5 g/dL    A-G Ratio 0.9 (L) 1.2 - 3.5     CBC WITH AUTOMATED DIFF    Collection Time: 02/26/22  5:49 AM   Result Value Ref Range    WBC 10.2 4.3 - 11.1 K/uL    RBC 4.20 (L) 4.23 - 5.6 M/uL    HGB 11.2 (L) 13.6 - 17.2 g/dL    HCT 36.3 (L) 41.1 - 50.3 %    MCV 86.4 79.6 - 97.8 FL    MCH 26.7 26.1 - 32.9 PG    MCHC 30.9 (L) 31.4 - 35.0 g/dL    RDW 15.3 (H) 11.9 - 14.6 %    PLATELET 507 254 - 919 K/uL    MPV 10.7 9.4 - 12.3 FL    ABSOLUTE NRBC 0.00 0.0 - 0.2 K/uL    DF AUTOMATED      NEUTROPHILS 75 43 - 78 %    LYMPHOCYTES 12 (L) 13 - 44 %    MONOCYTES 6 4.0 - 12.0 %    EOSINOPHILS 6 0.5 - 7.8 %    BASOPHILS 1 0.0 - 2.0 %    IMMATURE GRANULOCYTES 0 0.0 - 5.0 %    ABS. NEUTROPHILS 7.7 1.7 - 8.2 K/UL    ABS. LYMPHOCYTES 1.2 0.5 - 4.6 K/UL    ABS. MONOCYTES 0.6 0.1 - 1.3 K/UL    ABS. EOSINOPHILS 0.6 0.0 - 0.8 K/UL    ABS. BASOPHILS 0.1 0.0 - 0.2 K/UL    ABS. IMM. GRANS. 0.0 0.0 - 0.5 K/UL   PLEASE READ & DOCUMENT PPD TEST IN 24 HRS    Collection Time: 02/26/22  8:56 AM   Result Value Ref Range    PPD Negative Negative    mm Induration 0 0 - 5 mm       All Micro Results     Procedure Component Value Units Date/Time    CULTURE, URINE [[id number]] Collected: 02/24/22 1949    Order Status: Completed Specimen: Urine from Clean catch Updated: 02/26/22 0858     Special Requests: NO SPECIAL REQUESTS        Culture result:       NO GROWTH AFTER SHORT PERIOD OF INCUBATION. FURTHER RESULTS TO FOLLOW AFTER OVERNIGHT INCUBATION.           CULTURE, BLOOD [[id number]] Collected: 02/24/22 1526    Order Status: Completed Specimen: Blood Updated: 02/26/22 0740     Special Requests: LEFT ANTECUBITAL        Culture result: NO GROWTH 2 DAYS       COVID-19 RAPID TEST [[id number]] Collected: 02/24/22 1850    Order Status: Completed Specimen: Nasopharyngeal Updated: 02/24/22 1918     Specimen source Nasopharyngeal        COVID-19 rapid test Not detected        Comment:      The specimen is NEGATIVE for SARS-CoV-2, the novel coronavirus associated with COVID-19. A negative result does not rule out COVID-19. This test has been authorized by the FDA under an Emergency Use Authorization (EUA) for use by authorized laboratories. Fact sheet for Healthcare Providers: [employer]Cell[employer]nz  Fact sheet for Patients: Vivastream[employer]nz       Methodology: Isothermal Nucleic Acid Amplification               Other Studies:  No results found.     Current Meds:  Current Facility-Administered Medications   Medication Dose Route Frequency    [START ON 2/27/2022] amLODIPine (NORVASC) tablet 5 mg  5 mg Oral DAILY    apixaban (ELIQUIS) tablet 5 mg  5 mg Oral BID    aspirin delayed-release tablet 81 mg  81 mg Oral DAILY    donepeziL (ARICEPT) tablet 10 mg  10 mg Oral DAILY    nitroglycerin (NITROSTAT) tablet 0.4 mg  0.4 mg SubLINGual PRN    isosorbide mononitrate ER (IMDUR) tablet 30 mg  30 mg Oral QHS    memantine (NAMENDA) tablet 15 mg  15 mg Oral QHS    sertraline (ZOLOFT) tablet 50 mg  50 mg Oral QHS    atorvastatin (LIPITOR) tablet 20 mg  20 mg Oral QHS    sodium chloride (NS) flush 5-40 mL  5-40 mL IntraVENous Q8H    sodium chloride (NS) flush 5-40 mL  5-40 mL IntraVENous PRN    acetaminophen (TYLENOL) tablet 650 mg  650 mg Oral Q6H PRN    Or    acetaminophen (TYLENOL) suppository 650 mg  650 mg Rectal Q6H PRN    polyethylene glycol (MIRALAX) packet 17 g  17 g Oral DAILY PRN    ondansetron (ZOFRAN ODT) tablet 4 mg  4 mg Oral Q8H PRN    Or    ondansetron (ZOFRAN) injection 4 mg  4 mg IntraVENous Q6H PRN    0.9% sodium chloride infusion  100 mL/hr IntraVENous CONTINUOUS    cefTRIAXone (ROCEPHIN) 1 g in 0.9% sodium chloride (MBP/ADV) 50 mL MBP  1 g IntraVENous Q24H       Signed:  Shama Casas NP    Notes, labs, VS, diagnostic testing reviewed  Case discussed with care team ,Dr. Cara Thomas, wife at bedside

## 2022-02-26 NOTE — PROGRESS NOTES
Problem: Self Care Deficits Care Plan (Adult)  Goal: *Acute Goals and Plan of Care (Insert Text)  Outcome: Progressing Towards Goal  Note:   1. Patient will complete lower body dressing with minimal assist to increase self care independence. 2. Patient will complete toileting with moderate assist to increase self care independence. 3. Patient will improve static standing and dynamic standing at edge of bed for 5 minutes to improve independence with transfers and self cares. 4. Patient will tolerate 35 minutes of OT treatment with self incorporated rest breaks to increase activity tolerance to enhance participation in hobbies. 5. Patient will complete all functional transfers with contact guard assist using adaptive equipment as needed. Timeframe: 7 visits       OCCUPATIONAL THERAPY: Initial Assessment, Daily Note, and AM 2/26/2022  INPATIENT: OT Visit Days: 1  Payor: Binh Elkins / Plan: Alexa Myers / Product Type: RollCall (roll.to) Care Medicare /      NAME/AGE/GENDER: Rafael Troy is a 80 y.o. male   PRIMARY DIAGNOSIS:  Acute metabolic encephalopathy [M98.01] Acute metabolic encephalopathy Acute metabolic encephalopathy        ICD-10: Treatment Diagnosis:    · Generalized Muscle Weakness (M62.81)  · Other lack of cordination (R27.8)  · Difficulty in walking, Not elsewhere classified (R26.2)  · History of falling (Z91.81)   Precautions/Allergies:   Penicillins      ASSESSMENT:     Mr. Jay Barney presents with the above diagnosis and overall deficits in strength, functional mobility, and ADL performance. At baseline, he lives with spouse and has assistance with ADLs and transfers. Per chart, late onset Alzheimer's and baseline mentation of answering \"yes/no. \" He mobilizes with a RW at home and has a WC and BSC. Pt had a recent fall at home that resulted in a R clavicle fracture. Manage with sling. Today, pt somewhat more alert than yesterday, but unable to carry on conversation.  He required min A from supine<>sit, maintained good sitting balance at EOB, and was able to eat lunch with setup A. Pt also able to perform several sit<>stands at EOB with min HHA x2 and took steps along the EOB. Pt returned to supine with all needs met, alarm on, and spouse at bedside. He is functioning below his baseline and will benefit from skilled OT during hospital stay and further rehab upon discharge. This section established at most recent assessment   PROBLEM LIST (Impairments causing functional limitations):  1. Decreased Strength  2. Decreased ADL/Functional Activities  3. Decreased Transfer Abilities  4. Decreased Ambulation Ability/Technique  5. Decreased Balance  6. Decreased Activity Tolerance  7. Decreased Cognition   INTERVENTIONS PLANNED: (Benefits and precautions of occupational therapy have been discussed with the patient.)  1. Activities of daily living training  2. Adaptive equipment training  3. Balance training  4. Neuromuscular re-eduation  5. Therapeutic activity  6. Therapeutic exercise     TREATMENT PLAN: Frequency/Duration: Follow patient 3x/week to address above goals. Rehabilitation Potential For Stated Goals: Good     REHAB RECOMMENDATIONS (at time of discharge pending progress):    Placement: It is my opinion, based on this patient's performance to date, that Mr. Bonnie Wyatt may benefit from intensive therapy at a [address] [place] [address] after discharge due to the functional deficits listed above that are likely to improve with skilled rehabilitation and concerns that he/she may be unsafe to be unsupervised at home due to recent fall and declined in level of independence .   Equipment:    None at this time              OCCUPATIONAL PROFILE AND HISTORY:   History of Present Injury/Illness (Reason for Referral):  See H&P  Past Medical History/Comorbidities:   Mr. 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 Mr. Bisi Hennessy  has no past surgical history on file. Social History/Living Environment:   Home Environment: Private residence  # Steps to Enter: 0  Wheelchair Ramp: Yes  One/Two Story Residence: Two story, live on 1st floor  Living Alone: No  Support Systems: Spouse/Significant Other,Other Family Member(s) (gr dtr)  Patient Expects to be Discharged to[de-identified] Rehab Unit Subacute  Current DME Used/Available at Home: Walker, rolling  Tub or Shower Type: Shower  Prior Level of Function/Work/Activity:  Assistance with ADLs from spouse, RW for mobility     Number of Personal Factors/Comorbidities that affect the Plan of Care: Expanded review of therapy/medical records (1-2):  MODERATE COMPLEXITY   ASSESSMENT OF OCCUPATIONAL PERFORMANCE[de-identified]   Activities of Daily Living:   Basic ADLs (From Assessment) Complex ADLs (From Assessment)   Feeding: Setup  Oral Facial Hygiene/Grooming: Minimum assistance  Bathing: Maximum assistance  Upper Body Dressing: Minimum assistance  Lower Body Dressing: Maximum assistance  Toileting: Total assistance     Grooming/Bathing/Dressing Activities of Daily Living                             Bed/Mat Mobility  Supine to Sit: Minimum assistance  Sit to Supine: Minimum assistance  Sit to Stand: Minimum assistance;Assist x2  Stand to Sit: Minimum assistance;Assist x2     Most Recent Physical Functioning:   Gross Assessment:                  Posture:     Balance:  Sitting: Intact  Standing: Impaired; With support  Standing - Static: Fair  Standing - Dynamic : Fair;Poor Bed Mobility:  Supine to Sit: Minimum assistance  Sit to Supine: Minimum assistance  Wheelchair Mobility:     Transfers:  Sit to Stand: Minimum assistance;Assist x2  Stand to Sit: Minimum assistance;Assist x2            Patient Vitals for the past 6 hrs:   BP BP Patient Position SpO2 Pulse   02/26/22 0817 (!) 174/72 At rest 96 % 60   02/26/22 1115 (!) 173/69 Semi fowlers 97 % 60       Mental Status  Neurologic State: Drowsy,Alert  Orientation Level: Unable to verbalize  Cognition: Decreased attention/concentration,Decreased command following  Safety/Judgement: Fall prevention                          Physical Skills Involved:  1. Balance  2. Strength  3. Activity Tolerance Cognitive Skills Affected (resulting in the inability to perform in a timely and safe manner):  1. Executive Function  2. Divided Attention  3. Comprehension  4. Expression Psychosocial Skills Affected:  1. Environmental Adaptation   Number of elements that affect the Plan of Care: 5+:  HIGH COMPLEXITY   CLINICAL DECISION MAKIN[address] [place] [address] AM-PAC 6 Clicks   Daily Activity Inpatient Short Form  How much help from another person does the patient currently need. .. Total A Lot A Little None   1. Putting on and taking off regular lower body clothing? [] 1   [x] 2   [] 3   [] 4   2. Bathing (including washing, rinsing, drying)? [] 1   [x] 2   [] 3   [] 4   3. Toileting, which includes using toilet, bedpan or urinal?   [x] 1   [] 2   [] 3   [] 4   4. Putting on and taking off regular upper body clothing? [] 1   [] 2   [x] 3   [] 4   5. Taking care of personal grooming such as brushing teeth? [] 1   [] 2   [x] 3   [] 4   6. Eating meals? [] 1   [] 2   [x] 3   [] 4   © , Trustees of [address] [place] [address], under license to [employer]. All rights reserved      Score:  Initial: 14 Most Recent: X (Date: -- )    Interpretation of Tool:  Represents activities that are increasingly more difficult (i.e. Bed mobility, Transfers, Gait). Medical Necessity:     · Skilled intervention continues to be required due to the above deficits. Reason for Services/Other Comments:  · Patient continues to require skilled intervention due to   · Recent fall and decline in function  · .    Use of outcome tool(s) and clinical judgement create a POC that gives a: MODERATE COMPLEXITY         TREATMENT:   (In addition to Assessment/Re-Assessment sessions the following treatments were rendered)     Pre-treatment Symptoms/Complaints:    Pain: Initial:   Pain Intensity 1: 0  Post Session:  0     Co-treatment was necessary to improve patient's cognitive participation, ability to follow higher level commands, ability to increase activity demands, and ability to return to normal functional activity. Self Care: (25): Procedure(s) (per grid) utilized to improve and/or restore self-care/home management as related to self feeding and functional mobility and transfers . Required minimal verbal, manual, and tactile cueing to facilitate activities of daily living skills and compensatory activities. Evaluation complete    Braces/Orthotics/Lines/Etc:   · IV  · [place]  · O2 Device: None (Room air)  Treatment/Session Assessment:    · Response to Treatment:  Good, supine in bed  · Interdisciplinary Collaboration:   o Physical Therapist  o Occupational Therapist  o Registered Nurse  o Certified Nursing Assistant/Patient Care Technician  · After treatment position/precautions:   o Supine in bed  o Bed alarm/tab alert on  o Bed/Chair-wheels locked  o Bed in low position  o Caregiver at bedside  o Call light within reach  o RN notified   · Compliance with Program/Exercises: Will assess as treatment progresses. · Recommendations/Intent for next treatment session: \"Next visit will focus on advancements to more challenging activities and reduction in assistance provided\".   Total Treatment Duration:  OT Patient Time In/Time Out  Time In: 1150  Time Out: [address] [place], [place]

## 2022-02-26 NOTE — PROGRESS NOTES
Problem: Mobility Impaired (Adult and Pediatric)  Goal: *Acute Goals and Plan of Care (Insert Text)  Note: STG:  (1.)Mr. Littlejohn will move from supine to sit and sit to supine  with MINIMAL ASSIST within 7 treatment day(s). (2.)Mr. Littlejohn will transfer from bed to chair and chair to bed with MINIMAL ASSIST using the least restrictive device within 7 treatment day(s). (3.)Mr. Littlejohn will ambulate with MINIMAL ASSIST for 25 feet with the least restrictive device within 7 treatment day(s). (4.)Pt. will increase B LE strength 1/2 grade within 7 days  (5.)Pt. will ambulate up/down ramp with appropriate assistive device and min assist within 7 days      ________________________________________________________________________________________________      PHYSICAL THERAPY: Daily Note and AM 2/26/2022  INPATIENT: PT Visit Days : 2  Payor: Shedrick Runner / Plan: Miah Gómez / Product Type: [employer] /       NAME/AGE/GENDER: Ryan Craft is a 80 y.o. male   PRIMARY DIAGNOSIS: Acute metabolic encephalopathy [N98.82] Acute metabolic encephalopathy Acute metabolic encephalopathy       ICD-10: Treatment Diagnosis:    · Generalized Muscle Weakness (M62.81)  · Other lack of cordination (R27.8)  · Other abnormalities of gait and mobility (R26.89)  · History of falling (Z91.81)   Precaution/Allergies:  Penicillins      ASSESSMENT:     Mr. Naina Bearden presents with acute metabolic encephalopathy and is experiencing a decline in his premorbid level of function. He is very lethargic today. He would benefit from further PT while here as he is able ozzy participate to address these deficits: decreased general strength and endurance, standing balance and functional mobility. He would benefit from further rehab in STR  to maximize his level of function to return home. This afternoon, he is very lethargic. His wife is present and answers my social questions.  He is unable to follow commands for exs, but is able to hold his LEs against gravity. Once he sits on edge of bed, he becomes more alert and opens eyes. He stands but is unable to take steps for me upon my command. He does bear weight through B LEs. He is returned to bed and bed alarm activated. 2/26 supine upon arrival.  Wife present. Work on bed mobility as follows:supine>eob with Min A. Sat on the eob working on his balance will eating. Sit>stand with HHA x 2. Went 4 ft  Up/down long  the side of the bed. Return to supine with needs in reach, alarm on and instructed to call for assist.    This section established at most recent assessment   PROBLEM LIST (Impairments causing functional limitations):  1. Decreased Strength  2. Decreased ADL/Functional Activities  3. Decreased Transfer Abilities  4. Decreased Ambulation Ability/Technique  5. Decreased Balance  6. Decreased Activity Tolerance  7. Increased Fatigue  8. Decreased Flexibility/Joint Mobility  9. Decreased [place] with Home Exercise Program  10. Decreased Cognition   INTERVENTIONS PLANNED: (Benefits and precautions of physical therapy have been discussed with the patient.)  1. Balance Exercise  2. Bed Mobility  3. Gait Training  4. Range of Motion (ROM)  5. Therapeutic Activites  6. Therapeutic Exercise/Strengthening  7. Transfer Training     TREATMENT PLAN: Frequency/Duration: daily for duration of hospital stay  Rehabilitation Potential For Stated Goals: [facility] (at time of discharge pending progress):    Placement: It is my opinion, based on this patient's performance to date, that Mr. Ana Maria Jason may benefit from intensive therapy at a [address] [place] [address] after discharge due to the functional deficits listed above that are likely to improve with skilled rehabilitation and concerns that he/she may be unsafe to be unsupervised at home due to the need for maximal assistance for functional mobility .   Equipment:    Will assess at  a later time              HISTORY: History of Present Injury/Illness (Reason for Referral):  Sustained a fall. Acute metabolic encephalopathy  Past Medical History/Comorbidities:   Mr. Tai Rivera  has a past medical history of Axonal neuropathy (8/4/2020), Late onset Alzheimer's disease without behavioral disturbance ([facility] [address] (8/4/2020), Reactive depression (11/18/2020), and Unsteady gait (3/16/2021). Mr. Tai Rivera  has no past surgical history on file. Social History/Living Environment:   Home Environment: Private residence  # Steps to Enter: 0  Wheelchair Ramp: Yes  One/Two Story Residence: Two story, live on 1st floor  Living Alone: No  Support Systems: Spouse/Significant Other,Other Family Member(s) (gr dtr)  Patient Expects to be Discharged to[de-identified] Rehab Unit Subacute  Current DME Used/Available at Home: Walker, rolling  Tub or Shower Type: Shower  Prior Level of Function/Work/Activity:  Functionally I with bathing, wife assisted with dressing. He ambulated without a device in the home  Dominant Side:         RIGHT   Number of Personal Factors/Comorbidities that affect the Plan of Care: 1-2: MODERATE COMPLEXITY   EXAMINATION:   Most Recent Physical Functioning:   Gross Assessment:                  Posture:     Balance:  Sitting: Intact  Standing: Impaired; With support  Standing - Static: Fair  Standing - Dynamic : Fair;Poor Bed Mobility:  Supine to Sit: Minimum assistance  Sit to Supine: Minimum assistance  Wheelchair Mobility:     Transfers:  Sit to Stand: Minimum assistance;Assist x2  Stand to Sit: Minimum assistance;Assist x2  Duration: 30 Minutes  Gait:     Distance (ft): 4 Feet (ft) (along the side of the bed)  Assistive Device:  (HHA x 2)         Body Structures Involved:  1. Metabolic  2. Joints  3. Muscles Body Functions Affected:  1. Mental  2. Neuromusculoskeletal  3. Movement Related  4. Metobolic/Endocrine Activities and Participation Affected:  1. Learning and Applying Knowledge  2. General Tasks and Demands  3. Mobility  4.  Self Care   Number of elements that affect the Plan of Care: 4+: HIGH COMPLEXITY   CLINICAL PRESENTATION:   Presentation: Evolving clinical presentation with changing clinical characteristics: MODERATE COMPLEXITY   CLINICAL DECISION MAKIN [facility] Mobility Inpatient Short Form  How much difficulty does the patient currently have. .. Unable A Lot A Little None   1. Turning over in bed (including adjusting bedclothes, sheets and blankets)? [] 1   [x] 2   [] 3   [] 4   2. Sitting down on and standing up from a chair with arms ( e.g., wheelchair, bedside commode, etc.)   [] 1   [x] 2   [] 3   [] 4   3. Moving from lying on back to sitting on the side of the bed? [] 1   [x] 2   [] 3   [] 4   How much help from another person does the patient currently need. .. Total A Lot A Little None   4. Moving to and from a bed to a chair (including a wheelchair)? [] 1   [x] 2   [] 3   [] 4   5. Need to walk in hospital room? [x] 1   [] 2   [] 3   [] 4   6. Climbing 3-5 steps with a railing? [x] 1   [] 2   [] 3   [] 4   © , Trustees of [facility], under license to [employer]. All rights reserved      Score:  Initial: 10 Most Recent: X (Date: -- )    Interpretation of Tool:  Represents activities that are increasingly more difficult (i.e. Bed mobility, Transfers, Gait). Medical Necessity:     · Patient demonstrates   · fair  ·  rehab potential due to higher previous functional level. Reason for Services/Other Comments:  · Patient   · continues to require present interventions due to patient's inability to perform functional mobility at a safe minimal assist level  · .    Use of outcome tool(s) and clinical judgement create a POC that gives a: Questionable prediction of patient's progress: MODERATE COMPLEXITY            TREATMENT:   (In addition to Assessment/Re-Assessment sessions the following treatments were rendered)   Pre-treatment Symptoms/Complaints:  More awake  Pain: Initial: Post Session:       Gait Training ( ):  Gait training to improve and/or restore physical functioning as related to mobility. Ambulated 4 Feet (ft) (along the side of the bed) with   using a  (HHA x 2) and minimal   A with verbal cues. Therapeutic Activity: (  30 Minutes ):  Therapeutic activities including Bed transfers to improve strength and balance. Required minimal             Braces/Orthotics/Lines/Etc:   · IV  · purewick  · O2 Device: None (Room air)  Treatment/Session Assessment:    · Response to Treatment:  more alert, able to take steps  Along side of the bed with HHA x 2  · Interdisciplinary Collaboration:   o Physical Therapy Assistant  o Registered Nurse  · After treatment position/precautions:   o Supine in bed  o Bed alarm/tab alert on  o Bed/Chair-wheels locked  o Bed in low position  o Call light within reach  o RN notified  o Family at bedside  o Side rails x 3   · Compliance with Program/Exercises: Will assess as treatment progresses  · Recommendations/Intent for next treatment session: \"Next visit will focus on advancements to more challenging activities and reduction in assistance provided\".   Total Treatment Duration:  PT Patient Time In/Time Out  Time In: 1150  Time Out: 100 Skyler Albarran, SERGO

## 2022-02-26 NOTE — PROGRESS NOTES
Problem: Pressure Injury - Risk of  Goal: *Prevention of pressure injury  Description: Document Jose Scale and appropriate interventions in the flowsheet. Outcome: Progressing Towards Goal  Note: Pressure Injury Interventions:  Sensory Interventions: Assess changes in LOC,Maintain/enhance activity level,Pressure redistribution bed/mattress (bed type)    Moisture Interventions: Absorbent underpads,Internal/External urinary devices,Maintain skin hydration (lotion/cream)    Activity Interventions: Increase time out of bed,Pressure redistribution bed/mattress(bed type),PT/OT evaluation    Mobility Interventions: HOB 30 degrees or less,Pressure redistribution bed/mattress (bed type),PT/OT evaluation    Nutrition Interventions: Offer support with meals,snacks and hydration,Document food/fluid/supplement intake    Friction and Shear Interventions: Foam dressings/transparent film/skin sealants,HOB 30 degrees or less                Problem: Falls - Risk of  Goal: *Absence of Falls  Description: Document Trevon Fall Risk and appropriate interventions in the flowsheet.   Outcome: Progressing Towards Goal  Note: Fall Risk Interventions:  Mobility Interventions: Bed/chair exit alarm,Communicate number of staff needed for ambulation/transfer,Patient to call before getting OOB    Mentation Interventions: Bed/chair exit alarm,Adequate sleep, hydration, pain control,Door open when patient unattended,Evaluate medications/consider consulting pharmacy,Family/sitter at [employer] frequent rounding,Room close to nurse's station    Medication Interventions: Bed/chair exit alarm,Evaluate medications/consider consulting pharmacy,Patient to call before getting OOB,Teach patient to arise slowly    Elimination Interventions: Bed/chair exit alarm,Call light in reach,Patient to call for help with toileting needs,Toilet paper/wipes in reach,Toileting schedule/hourly rounds    History of Falls Interventions: Bed/chair exit alarm,Consult care management for discharge planning,Door open when patient unattended,Evaluate medications/consider consulting pharmacy,Investigate reason for fall,Room close to nurse's station

## 2022-02-27 LAB
ALBUMIN SERPL-MCNC: 2.7 G/DL (ref 3.2–4.6)
ALBUMIN/GLOB SERPL: 0.9 {RATIO} (ref 1.2–3.5)
ALP SERPL-CCNC: 85 U/L (ref 50–136)
ALT SERPL-CCNC: 16 U/L (ref 12–65)
ANION GAP SERPL CALC-SCNC: 4 MMOL/L (ref 7–16)
AST SERPL-CCNC: 16 U/L (ref 15–37)
BASOPHILS # BLD: 0 K/UL (ref 0–0.2)
BASOPHILS NFR BLD: 1 % (ref 0–2)
BILIRUB SERPL-MCNC: 0.3 MG/DL (ref 0.2–1.1)
BUN SERPL-MCNC: 18 MG/DL (ref 8–23)
CALCIUM SERPL-MCNC: 8.5 MG/DL (ref 8.3–10.4)
CHLORIDE SERPL-SCNC: 111 MMOL/L (ref 98–107)
CO2 SERPL-SCNC: 29 MMOL/L (ref 21–32)
CREAT SERPL-MCNC: 0.98 MG/DL (ref 0.8–1.5)
DIFFERENTIAL METHOD BLD: ABNORMAL
EOSINOPHIL # BLD: 0.6 K/UL (ref 0–0.8)
EOSINOPHIL NFR BLD: 7 % (ref 0.5–7.8)
ERYTHROCYTE [DISTWIDTH] IN BLOOD BY AUTOMATED COUNT: 15.6 % (ref 11.9–14.6)
GLOBULIN SER CALC-MCNC: 3 G/DL (ref 2.3–3.5)
GLUCOSE SERPL-MCNC: 99 MG/DL (ref 65–100)
HCT VFR BLD AUTO: 35.9 % (ref 41.1–50.3)
HGB BLD-MCNC: 11.1 G/DL (ref 13.6–17.2)
IMM GRANULOCYTES # BLD AUTO: 0 K/UL (ref 0–0.5)
IMM GRANULOCYTES NFR BLD AUTO: 0 % (ref 0–5)
LYMPHOCYTES # BLD: 1 K/UL (ref 0.5–4.6)
LYMPHOCYTES NFR BLD: 11 % (ref 13–44)
MCH RBC QN AUTO: 26.9 PG (ref 26.1–32.9)
MCHC RBC AUTO-ENTMCNC: 30.9 G/DL (ref 31.4–35)
MCV RBC AUTO: 87.1 FL (ref 79.6–97.8)
MM INDURATION POC: 0 MM (ref 0–5)
MONOCYTES # BLD: 0.6 K/UL (ref 0.1–1.3)
MONOCYTES NFR BLD: 7 % (ref 4–12)
NEUTS SEG # BLD: 6.6 K/UL (ref 1.7–8.2)
NEUTS SEG NFR BLD: 75 % (ref 43–78)
NRBC # BLD: 0 K/UL (ref 0–0.2)
PLATELET # BLD AUTO: 219 K/UL (ref 150–450)
PMV BLD AUTO: 10.5 FL (ref 9.4–12.3)
POTASSIUM SERPL-SCNC: 3.7 MMOL/L (ref 3.5–5.1)
PPD POC: NEGATIVE NEGATIVE
PROT SERPL-MCNC: 5.7 G/DL (ref 6.3–8.2)
RBC # BLD AUTO: 4.12 M/UL (ref 4.23–5.6)
SODIUM SERPL-SCNC: 144 MMOL/L (ref 136–145)
WBC # BLD AUTO: 8.8 K/UL (ref 4.3–11.1)

## 2022-02-27 PROCEDURE — 74011000250 HC RX REV CODE- 250: Performed by: FAMILY MEDICINE

## 2022-02-27 PROCEDURE — 74011250636 HC RX REV CODE- 250/636: Performed by: NURSE PRACTITIONER

## 2022-02-27 PROCEDURE — 65270000029 HC RM PRIVATE

## 2022-02-27 PROCEDURE — 74011250637 HC RX REV CODE- 250/637: Performed by: NURSE PRACTITIONER

## 2022-02-27 PROCEDURE — 2709999900 HC NON-CHARGEABLE SUPPLY

## 2022-02-27 PROCEDURE — 74011250637 HC RX REV CODE- 250/637: Performed by: FAMILY MEDICINE

## 2022-02-27 PROCEDURE — 97530 THERAPEUTIC ACTIVITIES: CPT

## 2022-02-27 PROCEDURE — 85025 COMPLETE CBC W/AUTO DIFF WBC: CPT

## 2022-02-27 PROCEDURE — 36415 COLL VENOUS BLD VENIPUNCTURE: CPT

## 2022-02-27 PROCEDURE — 80053 COMPREHEN METABOLIC PANEL: CPT

## 2022-02-27 PROCEDURE — 74011250636 HC RX REV CODE- 250/636: Performed by: FAMILY MEDICINE

## 2022-02-27 RX ORDER — AMLODIPINE BESYLATE 10 MG/1
10 TABLET ORAL DAILY
Status: DISCONTINUED | OUTPATIENT
Start: 2022-02-28 | End: 2022-03-01 | Stop reason: HOSPADM

## 2022-02-27 RX ORDER — HYDRALAZINE HYDROCHLORIDE 20 MG/ML
20 INJECTION INTRAMUSCULAR; INTRAVENOUS ONCE
Status: DISCONTINUED | OUTPATIENT
Start: 2022-02-27 | End: 2022-02-27

## 2022-02-27 RX ORDER — HYDRALAZINE HYDROCHLORIDE 20 MG/ML
20 INJECTION INTRAMUSCULAR; INTRAVENOUS
Status: DISCONTINUED | OUTPATIENT
Start: 2022-02-27 | End: 2022-03-01 | Stop reason: HOSPADM

## 2022-02-27 RX ADMIN — HYDRALAZINE HYDROCHLORIDE 20 MG: 20 INJECTION INTRAMUSCULAR; INTRAVENOUS at 14:18

## 2022-02-27 RX ADMIN — SERTRALINE 50 MG: 50 TABLET, FILM COATED ORAL at 21:07

## 2022-02-27 RX ADMIN — APIXABAN 5 MG: 5 TABLET, FILM COATED ORAL at 21:07

## 2022-02-27 RX ADMIN — ISOSORBIDE MONONITRATE 30 MG: 30 TABLET, EXTENDED RELEASE ORAL at 21:07

## 2022-02-27 RX ADMIN — APIXABAN 5 MG: 5 TABLET, FILM COATED ORAL at 09:05

## 2022-02-27 RX ADMIN — SODIUM CHLORIDE, PRESERVATIVE FREE 10 ML: 5 INJECTION INTRAVENOUS at 05:30

## 2022-02-27 RX ADMIN — ATORVASTATIN CALCIUM 20 MG: 10 TABLET, FILM COATED ORAL at 21:07

## 2022-02-27 RX ADMIN — SODIUM CHLORIDE, PRESERVATIVE FREE 10 ML: 5 INJECTION INTRAVENOUS at 21:13

## 2022-02-27 RX ADMIN — Medication 81 MG: at 09:04

## 2022-02-27 RX ADMIN — SODIUM CHLORIDE 100 ML/HR: 900 INJECTION, SOLUTION INTRAVENOUS at 14:18

## 2022-02-27 RX ADMIN — SODIUM CHLORIDE 100 ML/HR: 900 INJECTION, SOLUTION INTRAVENOUS at 03:28

## 2022-02-27 RX ADMIN — AMLODIPINE BESYLATE 5 MG: 5 TABLET ORAL at 09:05

## 2022-02-27 RX ADMIN — DONEPEZIL HYDROCHLORIDE 10 MG: 5 TABLET, FILM COATED ORAL at 09:05

## 2022-02-27 RX ADMIN — MEMANTINE 15 MG: 5 TABLET ORAL at 21:07

## 2022-02-27 RX ADMIN — SODIUM CHLORIDE, PRESERVATIVE FREE 10 ML: 5 INJECTION INTRAVENOUS at 14:19

## 2022-02-27 NOTE — PROGRESS NOTES
Referrals to [address] [place] [address] and [address] [place] [address]. Awaiting bed offers.      Melissa Paige LMSW    [facility]    * [email or web address]

## 2022-02-27 NOTE — PROGRESS NOTES
END OF SHIFT NOTE:    Intake/Output  02/26 1901 - 02/27 0700  In: 1295 [P.O.:240; I.V.:1055]  Out: 3500 [Urine:3500]   Voiding: YES  Catheter: YES;external cath  Drain:   External Urinary Catheter 02/25/22 (Active)   Site Assessment Clean, dry, & intact 02/27/22 0110   Repositioned No 02/27/22 0110   Perineal Care No 02/27/22 0110   Wick Changed No 02/27/22 0110   Suction Canister/Tubing Changed No 02/27/22 0110   Urine Output (mL) 900 ml 02/27/22 0110               Stool:  0 occurrences. Emesis:  0 occurrences. VITAL SIGNS  Patient Vitals for the past 12 hrs:   Temp Pulse Resp BP SpO2   02/27/22 0328 98.5 °F (36.9 °C) 62 -- (!) 163/67 95 %   02/26/22 2327 98.6 °F (37 °C) 60 18 (!) 154/68 96 %   02/26/22 1945 98 °F (36.7 °C) 60 18 (!) 144/69 97 %       Pain Assessment  Pain 1  Pain Scale 1: Visual (02/27/22 0110)  Pain Intensity 1: 0 (02/27/22 0110)  Patient Stated Pain Goal: Unable to verbalize/indicate pain (02/27/22 0110)  Pain Reassessment 1: Yes (02/26/22 1305)    Ambulating  No    Additional Information:   Confused;gets restless;agitaed when care given;turning refused. Wife at [facility]. IV antibiotic continues for UTI. Shift report given to oncoming nurse AbbottRN at the bedside.     Connie Mcgiure RN

## 2022-02-27 NOTE — PROGRESS NOTES
Problem: Mobility Impaired (Adult and Pediatric)  Goal: *Acute Goals and Plan of Care (Insert Text)  Note: STG:  (1.)Mr. Littlejohn will move from supine to sit and sit to supine  with MINIMAL ASSIST within 7 treatment day(s). (2.)Mr. Littlejohn will transfer from bed to chair and chair to bed with MINIMAL ASSIST using the least restrictive device within 7 treatment day(s). (3.)Mr. Littlejohn will ambulate with MINIMAL ASSIST for 25 feet with the least restrictive device within 7 treatment day(s). (4.)Pt. will increase B LE strength 1/2 grade within 7 days  (5.)Pt. will ambulate up/down ramp with appropriate assistive device and min assist within 7 days      ________________________________________________________________________________________________      PHYSICAL THERAPY: Daily Note and AM 2/27/2022  INPATIENT: PT Visit Days : 3  Payor: Carine Arroyo / Plan: Annamaria Hughes / Product Type: [employer] Medicare /       NAME/AGE/GENDER: Annalisa Xavier is a 80 y.o. male   PRIMARY DIAGNOSIS: Acute metabolic encephalopathy [E48.64] Acute metabolic encephalopathy Acute metabolic encephalopathy       ICD-10: Treatment Diagnosis:    · Generalized Muscle Weakness (M62.81)  · Other lack of cordination (R27.8)  · Other abnormalities of gait and mobility (R26.89)  · History of falling (Z91.81)   Precaution/Allergies:  Penicillins      ASSESSMENT:     Mr. Marco Hightower presents with acute metabolic encephalopathy and is experiencing a decline in his premorbid level of function. He is very lethargic today. He would benefit from further PT while here as he is able ozzy participate to address these deficits: decreased general strength and endurance, standing balance and functional mobility. He would benefit from further rehab in STR  to maximize his level of function to return home. This afternoon, he is very lethargic. His wife is present and answers my social questions.  He is unable to follow commands for exs, but is able to hold his LEs against gravity. Once he sits on edge of bed, he becomes more alert and opens eyes. He stands but is unable to take steps for me upon my command. He does bear weight through B LEs. He is returned to bed and bed alarm activated. 2/26 supine upon arrival.  Wife present. Work on bed mobility as follows:supine>eob with Min A. Sat on the eob working on his balance will eating. Sit>stand with HHA x 2. Went 4 ft  Up/down long  the side of the bed. Return to supine with needs in reach, alarm on and instructed to call for assist.  2/27 supine upon arrival.  Performs B LE exercises with verbal cues and help. Work on bed mobility as follows:supine>eob with Min A. Sat on the eob and work on his balance. Sit>stand with gait belt and pt hands on therapist arm. Ambulated 4 ft toward the bottom of the bed and back to the top of the bed. Sat down on the eob for a break. Sit>stand with Min A, gait belt and pt hands on therapist arms, ambulated another 4 ft to the bottom of the bed and then back to the top. Return to supine with Min A. Remain in the bed with needs in reach, alarm on and instructed not to get up without assist.  Pt needs verbal cues for exercises. This section established at most recent assessment   PROBLEM LIST (Impairments causing functional limitations):  1. Decreased Strength  2. Decreased ADL/Functional Activities  3. Decreased Transfer Abilities  4. Decreased Ambulation Ability/Technique  5. Decreased Balance  6. Decreased Activity Tolerance  7. Increased Fatigue  8. Decreased Flexibility/Joint Mobility  9. Decreased [place] with Home Exercise Program  10. Decreased Cognition   INTERVENTIONS PLANNED: (Benefits and precautions of physical therapy have been discussed with the patient.)  1. Balance Exercise  2. Bed Mobility  3. Gait Training  4. Range of Motion (ROM)  5. Therapeutic Activites  6. Therapeutic Exercise/Strengthening  7.  Transfer Training     TREATMENT PLAN: Frequency/Duration: daily for duration of hospital stay  Rehabilitation Potential For Stated Goals: [facility] (at time of discharge pending progress):    Placement: It is my opinion, based on this patient's performance to date, that Mr. James Contreras may benefit from intensive therapy at a [address] [place] [address] after discharge due to the functional deficits listed above that are likely to improve with skilled rehabilitation and concerns that he/she may be unsafe to be unsupervised at home due to the need for maximal assistance for functional mobility . Equipment:    Will assess at  a later time              HISTORY:   History of Present Injury/Illness (Reason for Referral):  Sustained a fall. Acute metabolic encephalopathy  Past Medical History/Comorbidities:   Mr. James Contreras  has a past medical history of Axonal neuropathy (8/4/2020), Late onset Alzheimer's disease without behavioral disturbance ([facility] [address] (8/4/2020), Reactive depression (11/18/2020), and Unsteady gait (3/16/2021). Mr. James Contreras  has no past surgical history on file. Social History/Living Environment:   Home Environment: Private residence  # Steps to Enter: 0  Wheelchair Ramp: Yes  One/Two Story Residence: Two story, live on 1st floor  Living Alone: No  Support Systems: Spouse/Significant Other,Other Family Member(s) (gr dtr)  Patient Expects to be Discharged to[de-identified] Rehab Unit Subacute  Current DME Used/Available at Home: Walker, rolling  Tub or Shower Type: Shower  Prior Level of Function/Work/Activity:  Functionally I with bathing, wife assisted with dressing. He ambulated without a device in the home  Dominant Side:         RIGHT   Number of Personal Factors/Comorbidities that affect the Plan of Care: 1-2: MODERATE COMPLEXITY   EXAMINATION:   Most Recent Physical Functioning:   Gross Assessment:                  Posture:     Balance:  Sitting: Intact  Standing: Impaired; With support  Standing - Static: Fair  Standing - Dynamic : Fair;Poor Bed Mobility:  Supine to Sit: Minimum assistance  Sit to Supine: Minimum assistance  Wheelchair Mobility:     Transfers:  Sit to Stand: Minimum assistance; Additional time (with verbal cues)  Stand to Sit: Minimum assistance; Additional time (with verbal cues)  Level of Assistance: Minimum assistance (with verbal cues)  Duration: 40 Minutes  Gait:     Distance (ft): 8 Feet (ft) (up/down the side of bed with 1 rest breakd)  Assistive Device: Gait belt; Other (comment) (HHA)         Body Structures Involved:  1. Metabolic  2. Joints  3. Muscles Body Functions Affected:  1. Mental  2. Neuromusculoskeletal  3. Movement Related  4. Metobolic/Endocrine Activities and Participation Affected:  1. Learning and Applying Knowledge  2. General Tasks and Demands  3. Mobility  4. Self Care   Number of elements that affect the Plan of Care: 4+: HIGH COMPLEXITY   CLINICAL PRESENTATION:   Presentation: Evolving clinical presentation with changing clinical characteristics: MODERATE COMPLEXITY   CLINICAL DECISION MAKIN [facility] Mobility Inpatient Short Form  How much difficulty does the patient currently have. .. Unable A Lot A Little None   1. Turning over in bed (including adjusting bedclothes, sheets and blankets)? [] 1   [x] 2   [] 3   [] 4   2. Sitting down on and standing up from a chair with arms ( e.g., wheelchair, bedside commode, etc.)   [] 1   [x] 2   [] 3   [] 4   3. Moving from lying on back to sitting on the side of the bed? [] 1   [x] 2   [] 3   [] 4   How much help from another person does the patient currently need. .. Total A Lot A Little None   4. Moving to and from a bed to a chair (including a wheelchair)? [] 1   [x] 2   [] 3   [] 4   5. Need to walk in hospital room? [x] 1   [] 2   [] 3   [] 4   6. Climbing 3-5 steps with a railing? [x] 1   [] 2   [] 3   [] 4   © , Trustees of [address] [place] [address] [address] [address], under license to [employer].  All rights reserved      Score: Initial: 10 Most Recent: X (Date: -- )    Interpretation of Tool:  Represents activities that are increasingly more difficult (i.e. Bed mobility, Transfers, Gait). Medical Necessity:     · Patient demonstrates   · fair  ·  rehab potential due to higher previous functional level. Reason for Services/Other Comments:  · Patient   · continues to require present interventions due to patient's inability to perform functional mobility at a safe minimal assist level  · . Use of outcome tool(s) and clinical judgement create a POC that gives a: Questionable prediction of patient's progress: MODERATE COMPLEXITY            TREATMENT:   (In addition to Assessment/Re-Assessment sessions the following treatments were rendered)   Pre-treatment Symptoms/Complaints:  More awake  Pain: Initial:      Post Session:       Gait Training ( ):  Gait training to improve and/or restore physical functioning as related to mobility. Ambulated 8 Feet (ft) (up/down the side of bed with 1 rest breakd) with   using a Gait belt; Other (comment) (HHA) and minimal   A with verbal cues. Therapeutic Activity: (  40 Minutes ):  Therapeutic activities including Bed transfers to improve strength and balance. Required minimal        Date:  2/27 Date:   Date:     Activity/Exercise Parameters Parameters Parameters   Ankle pumps 10 aa     heelslides 10 aa     Hip ab/ad 10 aa                                   Braces/Orthotics/Lines/Etc:   · IV  · purewick  · O2 Device: None (Room air)  Treatment/Session Assessment:    · Response to Treatment:  Making steady progress with ambulation, with verbal cues  · Interdisciplinary Collaboration:   o Physical Therapy Assistant  o Registered Nurse  · After treatment position/precautions:   o Supine in bed  o Bed alarm/tab alert on  o Bed/Chair-wheels locked  o Bed in low position  o Call light within reach  o RN notified  o Family at bedside  o Side rails x 3   · Compliance with Program/Exercises:  Will assess as treatment progresses  · Recommendations/Intent for next treatment session: \"Next visit will focus on advancements to more challenging activities and reduction in assistance provided\".   Total Treatment Duration:  PT Patient Time In/Time Out  Time In: 0830  Time Out: [address] [facility] [place], PTA

## 2022-02-27 NOTE — PROGRESS NOTES
Hospitalist Progress Note   Admit Date:  2022  2:59 PM   Name:  Boo Miramontes   Age:  80 y.o. Sex:  male  :  6/10/1932   MRN:  [id number]   Room:  [facility]/    Presenting Complaint: Fall    Reason(s) for Admission: Acute metabolic encephalopathy [K77.62]     Hospital Course & Interval History:   Mr. Cristina Ward is a 81 yo male with PMH of alzheimer's disease, depression, aflutter who presented with worsening confusion X3-4 days.  Pt was found in the floor at home without evidence of trauma.  Found to have UTI. Started on rocephin. Urine cx not sent.   CT head and cspine without acute findings.  CXR showed mildly displaced oblique fx right clavicular diaphysis, no evidence of underlying pneumo. Ortho consulted, no intervention.  Pelvic xray neg for acute findings.  Pt lives with elderly wife and granddaughter who works during the day. Subjective/24hr Events (22): Tired after working with PT this morning. [facility] ROS:  10 systems unable to obtain due to mentation. Assessment & Plan:     Acute metabolic [id number] (3/27/7157)    Likely secondary to UTI  Improving per wife  Baseline mentation is answering \"yes and no\"  Continue abx, IVF   had episode of combativeness this morning, wife was able to settle him down  Encouraged blinds open, lights on, awake during the day, sleep at night, PT/OT involved  Would like to avoid pharmacological intervention if possible.  resolved, at baseline per wife       Late onset Alzheimer's disease without behavioral disturbance (HCC) (2020)    Home meds       UTI (urinary tract infection) (2022)  Continue rocephin  Order urine cx   NGTD   stop rocephin, urine cx NGTD.  Received 5 doses of rocephin     Afib  Home eliquis     Clavicle fx  Consult Ortho  No intervention per Ortho  Sling to RUE     HTN   Start norvasc   increase norvasc, add prn hydralazine             Dispo/Discharge Planning:      eliquis     Diet:  ADULT DIET Regular  DVT PPx: eliquis  Code status: Full Code    Hospital Problems as of 2/27/2022 Date Reviewed: 9/23/2021          Codes Class Noted - Resolved POA    * (Principal) Acute metabolic encephalopathy [id number]-MW: G93.41  ICD-9-CM: 348.31  2/24/2022 - Present Yes        UTI (urinary tract infection) ICD-10-CM: N39.0  ICD-9-CM: 599.0  2/24/2022 - Present Yes        Leukocytosis ICD-10-CM: D72.829  ICD-9-CM: 288.60  2/24/2022 - Present Yes        Normocytic anemia ICD-10-CM: D64.9  ICD-9-CM: 285.9  2/24/2022 - Present Yes        Late onset Alzheimer's disease without behavioral disturbance (HCC) ICD-10-CM: G30.1, F02.80  ICD-9-CM: 331.0, 294.10  8/4/2020 - Present Yes        Peripheral vascular disease ([facility] [address] ICD-10-CM: I73.9  ICD-9-CM: 443.9  6/14/2020 - Present Yes              Objective:     Patient Vitals for the past 24 hrs:   Temp Pulse Resp BP SpO2   02/27/22 1147 97.9 °F (36.6 °C) 63 18 (!) 175/70 95 %   02/27/22 0727 97.9 °F (36.6 °C) 60 16 (!) 192/84 97 %   02/27/22 0328 98.5 °F (36.9 °C) 62 18 (!) 163/67 95 %   02/26/22 2327 98.6 °F (37 °C) 60 18 (!) 154/68 96 %   02/26/22 1945 98 °F (36.7 °C) 60 18 (!) 144/69 97 %   02/26/22 1521 98.7 °F (37.1 °C) 61 18 (!) 154/69 100 %     Oxygen Therapy  O2 Sat (%): 95 % (02/27/22 1147)  Pulse via Oximetry: 60 beats per minute (02/24/22 1850)  O2 Device: None (Room air) (02/27/22 0328)    Estimated body mass index is 20.19 kg/m² as calculated from the following:    Height as of this encounter: 6' (1.829 m). Weight as of this encounter: 67.5 kg (148 lb 14.4 oz). Intake/Output Summary (Last 24 hours) at 2/27/2022 1253  Last data filed at 2/27/2022 1147  Gross per 24 hour   Intake 1920 ml   Output 4875 ml   Net -2955 ml         Physical Exam:     Blood pressure (!) 175/70, pulse 63, temperature 97.9 °F (36.6 °C), resp.  rate 18, height 6' (1.829 m), weight 67.5 kg (148 lb 14.4 oz), SpO2 95 %.    General:          Well nourished.  No overt distress. Appears chronically ill.  nonverbal this morning  Head:               Normocephalic, atraumatic  Eyes:               Sclerae appear normal.  Pupils equally round. ENT:                Nares appear normal, no drainage.  Moist oral mucosa  Neck:               No restricted ROM.  Trachea midline   CV:                  RRR.  No m/r/g.  No jugular venous distension. Lungs:             CTAB.  No wheezing, rhonchi, or rales.  Respirations even, unlabored.  Right clavicular deformity palpated. Abdomen:        Bowel sounds present.  Soft, non-tender (no grimacing with palpation), nondistended. Extremities:     No cyanosis or clubbing.  No edema. Rigid LE with ROM.    Skin:                No rashes and normal coloration.   Warm and dry.    Neuro:             CN II-XII grossly intact.   Sensation intact.  Alert, eyes open, non verbal this morning   Psych:             Normal mood and affect.      I have reviewed ordered lab tests and independently visualized imaging below:    Recent Labs:  Recent Results (from the past 48 hour(s))   METABOLIC PANEL, COMPREHENSIVE    Collection Time: 02/26/22  5:49 AM   Result Value Ref Range    Sodium 140 136 - 145 mmol/L    Potassium 3.7 3.5 - 5.1 mmol/L    Chloride 109 (H) 98 - 107 mmol/L    CO2 30 21 - 32 mmol/L    Anion gap 1 (L) 7 - 16 mmol/L    Glucose 88 65 - 100 mg/dL    BUN 24 (H) 8 - 23 MG/DL    Creatinine 1.01 0.8 - 1.5 MG/DL    GFR est AA >60 >60 ml/min/1.73m2    GFR est non-AA >60 >60 ml/min/1.73m2    Calcium 9.0 8.3 - 10.4 MG/DL    Bilirubin, total 0.4 0.2 - 1.1 MG/DL    ALT (SGPT) 14 12 - 65 U/L    AST (SGOT) 16 15 - 37 U/L    Alk.  phosphatase 80 50 - 136 U/L    Protein, total 5.8 (L) 6.3 - 8.2 g/dL    Albumin 2.7 (L) 3.2 - 4.6 g/dL    Globulin 3.1 2.3 - 3.5 g/dL    A-G Ratio 0.9 (L) 1.2 - 3.5     CBC WITH AUTOMATED DIFF    Collection Time: 02/26/22  5:49 AM   Result Value Ref Range    WBC 10.2 4.3 - 11.1 K/uL RBC 4.20 (L) 4.23 - 5.6 M/uL    HGB 11.2 (L) 13.6 - 17.2 g/dL    HCT 36.3 (L) 41.1 - 50.3 %    MCV 86.4 79.6 - 97.8 FL    MCH 26.7 26.1 - 32.9 PG    MCHC 30.9 (L) 31.4 - 35.0 g/dL    RDW 15.3 (H) 11.9 - 14.6 %    PLATELET 523 915 - 567 K/uL    MPV 10.7 9.4 - 12.3 FL    ABSOLUTE NRBC 0.00 0.0 - 0.2 K/uL    DF AUTOMATED      NEUTROPHILS 75 43 - 78 %    LYMPHOCYTES 12 (L) 13 - 44 %    MONOCYTES 6 4.0 - 12.0 %    EOSINOPHILS 6 0.5 - 7.8 %    BASOPHILS 1 0.0 - 2.0 %    IMMATURE GRANULOCYTES 0 0.0 - 5.0 %    ABS. NEUTROPHILS 7.7 1.7 - 8.2 K/UL    ABS. LYMPHOCYTES 1.2 0.5 - 4.6 K/UL    ABS. MONOCYTES 0.6 0.1 - 1.3 K/UL    ABS. EOSINOPHILS 0.6 0.0 - 0.8 K/UL    ABS. BASOPHILS 0.1 0.0 - 0.2 K/UL    ABS. IMM. GRANS. 0.0 0.0 - 0.5 K/UL   PLEASE READ & DOCUMENT PPD TEST IN 24 HRS    Collection Time: 02/26/22  8:56 AM   Result Value Ref Range    PPD Negative Negative    mm Induration 0 0 - 5 mm   CBC WITH AUTOMATED DIFF    Collection Time: 02/27/22  5:29 AM   Result Value Ref Range    WBC 8.8 4.3 - 11.1 K/uL    RBC 4.12 (L) 4.23 - 5.6 M/uL    HGB 11.1 (L) 13.6 - 17.2 g/dL    HCT 35.9 (L) 41.1 - 50.3 %    MCV 87.1 79.6 - 97.8 FL    MCH 26.9 26.1 - 32.9 PG    MCHC 30.9 (L) 31.4 - 35.0 g/dL    RDW 15.6 (H) 11.9 - 14.6 %    PLATELET 572 689 - 896 K/uL    MPV 10.5 9.4 - 12.3 FL    ABSOLUTE NRBC 0.00 0.0 - 0.2 K/uL    DF AUTOMATED      NEUTROPHILS 75 43 - 78 %    LYMPHOCYTES 11 (L) 13 - 44 %    MONOCYTES 7 4.0 - 12.0 %    EOSINOPHILS 7 0.5 - 7.8 %    BASOPHILS 1 0.0 - 2.0 %    IMMATURE GRANULOCYTES 0 0.0 - 5.0 %    ABS. NEUTROPHILS 6.6 1.7 - 8.2 K/UL    ABS. LYMPHOCYTES 1.0 0.5 - 4.6 K/UL    ABS. MONOCYTES 0.6 0.1 - 1.3 K/UL    ABS. EOSINOPHILS 0.6 0.0 - 0.8 K/UL    ABS. BASOPHILS 0.0 0.0 - 0.2 K/UL    ABS. IMM.  GRANS. 0.0 0.0 - 0.5 K/UL   METABOLIC PANEL, COMPREHENSIVE    Collection Time: 02/27/22  5:29 AM   Result Value Ref Range    Sodium 144 136 - 145 mmol/L    Potassium 3.7 3.5 - 5.1 mmol/L    Chloride 111 (H) 98 - 107 mmol/L CO2 29 21 - 32 mmol/L    Anion gap 4 (L) 7 - 16 mmol/L    Glucose 99 65 - 100 mg/dL    BUN 18 8 - 23 MG/DL    Creatinine 0.98 0.8 - 1.5 MG/DL    GFR est AA >60 >60 ml/min/1.73m2    GFR est non-AA >60 >60 ml/min/1.73m2    Calcium 8.5 8.3 - 10.4 MG/DL    Bilirubin, total 0.3 0.2 - 1.1 MG/DL    ALT (SGPT) 16 12 - 65 U/L    AST (SGOT) 16 15 - 37 U/L    Alk. phosphatase 85 50 - 136 U/L    Protein, total 5.7 (L) 6.3 - 8.2 g/dL    Albumin 2.7 (L) 3.2 - 4.6 g/dL    Globulin 3.0 2.3 - 3.5 g/dL    A-G Ratio 0.9 (L) 1.2 - 3.5         All Micro Results     Procedure Component Value Units Date/Time    CULTURE, URINE [[id number]] Collected: 02/24/22 1949    Order Status: Completed Specimen: Urine from Clean catch Updated: 02/27/22 0844     Special Requests: NO SPECIAL REQUESTS        Culture result:       10,000 to 50,000 COLONIES/mL MIXED SKIN SHASTA ISOLATED          CULTURE, BLOOD [[id number]] Collected: 02/24/22 1526    Order Status: Completed Specimen: Blood Updated: 02/27/22 0032     Special Requests: LEFT ANTECUBITAL        Culture result: NO GROWTH 3 DAYS       COVID-19 RAPID TEST [[id number]] Collected: 02/24/22 1850    Order Status: Completed Specimen: Nasopharyngeal Updated: 02/24/22 1918     Specimen source Nasopharyngeal        COVID-19 rapid test Not detected        Comment:      The specimen is NEGATIVE for SARS-CoV-2, the novel coronavirus associated with COVID-19. A negative result does not rule out COVID-19. This test has been authorized by the FDA under an Emergency Use Authorization (EUA) for use by authorized laboratories. Fact sheet for Healthcare Providers: ConventionUpdate.co.nz  Fact sheet for Patients: ConventionUpdate.co.nz       Methodology: Isothermal Nucleic Acid Amplification               Other Studies:  No results found.     Current Meds:  Current Facility-Administered Medications   Medication Dose Route Frequency    amLODIPine (NORVASC) tablet 5 mg 5 mg Oral DAILY    apixaban (ELIQUIS) tablet 5 mg  5 mg Oral BID    aspirin delayed-release tablet 81 mg  81 mg Oral DAILY    donepeziL (ARICEPT) tablet 10 mg  10 mg Oral DAILY    nitroglycerin (NITROSTAT) tablet 0.4 mg  0.4 mg SubLINGual PRN    isosorbide mononitrate ER (IMDUR) tablet 30 mg  30 mg Oral QHS    memantine (NAMENDA) tablet 15 mg  15 mg Oral QHS    sertraline (ZOLOFT) tablet 50 mg  50 mg Oral QHS    atorvastatin (LIPITOR) tablet 20 mg  20 mg Oral QHS    sodium chloride (NS) flush 5-40 mL  5-40 mL IntraVENous Q8H    sodium chloride (NS) flush 5-40 mL  5-40 mL IntraVENous PRN    acetaminophen (TYLENOL) tablet 650 mg  650 mg Oral Q6H PRN    Or    acetaminophen (TYLENOL) suppository 650 mg  650 mg Rectal Q6H PRN    polyethylene glycol (MIRALAX) packet 17 g  17 g Oral DAILY PRN    ondansetron (ZOFRAN ODT) tablet 4 mg  4 mg Oral Q8H PRN    Or    ondansetron (ZOFRAN) injection 4 mg  4 mg IntraVENous Q6H PRN    0.9% sodium chloride infusion  100 mL/hr IntraVENous CONTINUOUS    cefTRIAXone (ROCEPHIN) 1 g in 0.9% sodium chloride (MBP/ADV) 50 mL MBP  1 g IntraVENous Q24H       Signed:  Negra Bundy NP    Notes, labs, VS, diagnostic testing reviewed  Case discussed with care team, Dr. Marina Armando, wife at bedside TELEMETRY

## 2022-02-28 LAB
ALBUMIN SERPL-MCNC: 2.7 G/DL (ref 3.2–4.6)
ALBUMIN/GLOB SERPL: 0.9 {RATIO} (ref 1.2–3.5)
ALP SERPL-CCNC: 92 U/L (ref 50–136)
ALT SERPL-CCNC: 17 U/L (ref 12–65)
ANION GAP SERPL CALC-SCNC: 5 MMOL/L (ref 7–16)
AST SERPL-CCNC: 17 U/L (ref 15–37)
BACTERIA SPEC CULT: NORMAL
BASOPHILS # BLD: 0 K/UL (ref 0–0.2)
BASOPHILS NFR BLD: 0 % (ref 0–2)
BILIRUB SERPL-MCNC: 0.4 MG/DL (ref 0.2–1.1)
BUN SERPL-MCNC: 23 MG/DL (ref 8–23)
CALCIUM SERPL-MCNC: 8.4 MG/DL (ref 8.3–10.4)
CHLORIDE SERPL-SCNC: 111 MMOL/L (ref 98–107)
CO2 SERPL-SCNC: 27 MMOL/L (ref 21–32)
CREAT SERPL-MCNC: 1.04 MG/DL (ref 0.8–1.5)
DIFFERENTIAL METHOD BLD: ABNORMAL
EOSINOPHIL # BLD: 0.6 K/UL (ref 0–0.8)
EOSINOPHIL NFR BLD: 6 % (ref 0.5–7.8)
ERYTHROCYTE [DISTWIDTH] IN BLOOD BY AUTOMATED COUNT: 15.6 % (ref 11.9–14.6)
GLOBULIN SER CALC-MCNC: 3.1 G/DL (ref 2.3–3.5)
GLUCOSE SERPL-MCNC: 93 MG/DL (ref 65–100)
HCT VFR BLD AUTO: 35.2 % (ref 41.1–50.3)
HGB BLD-MCNC: 11.2 G/DL (ref 13.6–17.2)
IMM GRANULOCYTES # BLD AUTO: 0 K/UL (ref 0–0.5)
IMM GRANULOCYTES NFR BLD AUTO: 0 % (ref 0–5)
LYMPHOCYTES # BLD: 1.2 K/UL (ref 0.5–4.6)
LYMPHOCYTES NFR BLD: 12 % (ref 13–44)
MCH RBC QN AUTO: 27.4 PG (ref 26.1–32.9)
MCHC RBC AUTO-ENTMCNC: 31.8 G/DL (ref 31.4–35)
MCV RBC AUTO: 86.1 FL (ref 79.6–97.8)
MM INDURATION POC: 0 MM (ref 0–5)
MONOCYTES # BLD: 0.7 K/UL (ref 0.1–1.3)
MONOCYTES NFR BLD: 7 % (ref 4–12)
NEUTS SEG # BLD: 7.1 K/UL (ref 1.7–8.2)
NEUTS SEG NFR BLD: 74 % (ref 43–78)
NRBC # BLD: 0 K/UL (ref 0–0.2)
PLATELET # BLD AUTO: 212 K/UL (ref 150–450)
PMV BLD AUTO: 10.4 FL (ref 9.4–12.3)
POTASSIUM SERPL-SCNC: 3.9 MMOL/L (ref 3.5–5.1)
PPD POC: NEGATIVE NEGATIVE
PROT SERPL-MCNC: 5.8 G/DL (ref 6.3–8.2)
RBC # BLD AUTO: 4.09 M/UL (ref 4.23–5.6)
SERVICE CMNT-IMP: NORMAL
SODIUM SERPL-SCNC: 143 MMOL/L (ref 136–145)
WBC # BLD AUTO: 9.6 K/UL (ref 4.3–11.1)

## 2022-02-28 PROCEDURE — 2709999900 HC NON-CHARGEABLE SUPPLY

## 2022-02-28 PROCEDURE — 85025 COMPLETE CBC W/AUTO DIFF WBC: CPT

## 2022-02-28 PROCEDURE — 65270000029 HC RM PRIVATE

## 2022-02-28 PROCEDURE — 74011250637 HC RX REV CODE- 250/637: Performed by: NURSE PRACTITIONER

## 2022-02-28 PROCEDURE — 36415 COLL VENOUS BLD VENIPUNCTURE: CPT

## 2022-02-28 PROCEDURE — 74011250636 HC RX REV CODE- 250/636: Performed by: FAMILY MEDICINE

## 2022-02-28 PROCEDURE — 74011000250 HC RX REV CODE- 250: Performed by: FAMILY MEDICINE

## 2022-02-28 PROCEDURE — 74011250637 HC RX REV CODE- 250/637: Performed by: FAMILY MEDICINE

## 2022-02-28 PROCEDURE — 97530 THERAPEUTIC ACTIVITIES: CPT

## 2022-02-28 PROCEDURE — 77030019905 HC CATH URETH INTMIT MDII -A

## 2022-02-28 PROCEDURE — 80053 COMPREHEN METABOLIC PANEL: CPT

## 2022-02-28 RX ADMIN — SODIUM CHLORIDE, PRESERVATIVE FREE 10 ML: 5 INJECTION INTRAVENOUS at 21:15

## 2022-02-28 RX ADMIN — SODIUM CHLORIDE, PRESERVATIVE FREE 10 ML: 5 INJECTION INTRAVENOUS at 04:36

## 2022-02-28 RX ADMIN — Medication 81 MG: at 08:31

## 2022-02-28 RX ADMIN — APIXABAN 5 MG: 5 TABLET, FILM COATED ORAL at 08:31

## 2022-02-28 RX ADMIN — SODIUM CHLORIDE, PRESERVATIVE FREE 10 ML: 5 INJECTION INTRAVENOUS at 14:00

## 2022-02-28 RX ADMIN — APIXABAN 5 MG: 5 TABLET, FILM COATED ORAL at 21:14

## 2022-02-28 RX ADMIN — SERTRALINE 50 MG: 50 TABLET, FILM COATED ORAL at 21:15

## 2022-02-28 RX ADMIN — SODIUM CHLORIDE 100 ML/HR: 900 INJECTION, SOLUTION INTRAVENOUS at 13:35

## 2022-02-28 RX ADMIN — AMLODIPINE BESYLATE 10 MG: 10 TABLET ORAL at 08:31

## 2022-02-28 RX ADMIN — DONEPEZIL HYDROCHLORIDE 10 MG: 5 TABLET, FILM COATED ORAL at 08:31

## 2022-02-28 RX ADMIN — ISOSORBIDE MONONITRATE 30 MG: 30 TABLET, EXTENDED RELEASE ORAL at 21:14

## 2022-02-28 RX ADMIN — SODIUM CHLORIDE 100 ML/HR: 900 INJECTION, SOLUTION INTRAVENOUS at 23:11

## 2022-02-28 RX ADMIN — MEMANTINE 15 MG: 5 TABLET ORAL at 21:15

## 2022-02-28 RX ADMIN — ATORVASTATIN CALCIUM 20 MG: 10 TABLET, FILM COATED ORAL at 21:14

## 2022-02-28 NOTE — ROUTINE PROCESS
Bedside and Verbal shift change report given to Mykel Phillips RN (oncoming nurse) by Lizabeth Preston RN (offgoing nurse). Report included the following information SBAR, Kardex, MAR, Accordion and Recent Results.

## 2022-02-28 NOTE — PROGRESS NOTES
Hospitalist Progress Note   Admit Date:  2022  2:59 PM   Name:  Ofe Byrne   Age:  80 y.o. Sex:  male  :  6/10/1932   MRN:  [id number]   Room:  [facility]/    Presenting Complaint: Fall    Reason(s) for Admission: Acute metabolic encephalopathy [D49.25]     Hospital Course & Interval History:    81 yo male with PMH of alzheimer's disease, depression, aflutter who presented with worsening confusion X3-4 days.  Pt was found in the floor at home without evidence of trauma.  Found to have UTI. Started on rocephin. Urine cx not sent.   CT head and cspine without acute findings.  CXR showed mildly displaced oblique fx right clavicular diaphysis, no evidence of underlying pneumo. Ortho consulted, no intervention.  Pelvic xray neg for acute findings.  Pt lives with elderly wife and granddaughter who works during the day. Waiting on SNF placement, [facility] and [facility]. Subjective/24hr Events (22): In bed, does not open eyes. Spouse at bedside. Updated provided. Spouse states patient slept well. ROS:  10 systems unable to obtain due to mentation. Assessment & Plan:     Acute metabolic [id number] ()    Likely secondary to UTI  Improving per wife  Baseline mentation is answering \"yes and no\"  Continue abx, IVF   had episode of combativeness this morning, wife was able to settle him down  Encouraged blinds open, lights on, awake during the day, sleep at night, PT/OT involved  Would like to avoid pharmacological intervention if possible.  resolved, at baseline per wife       Late onset Alzheimer's disease without behavioral disturbance (HCC) (2020)    Home meds       UTI (urinary tract infection) (2022)  Continue rocephin  Order urine cx   NGTD   stop rocephin, urine cx NGTD.  Received 5 doses of rocephin     Afib  Home eliquis     Clavicle fx  Consult Ortho  No intervention per Ortho  Sling to RUE     HTN   Start norvasc   increase norvasc, add prn hydralazine  2/28  Better control with BP, continue with current regimen.             Dispo/Discharge Planning:      SNF, [facility] or [facility]-, CM assisting.     Diet:  ADULT DIET Regular  DVT PPx: eliquis  Code status: Full Code    Hospital Problems as of 2/28/2022 Date Reviewed: 9/23/2021          Codes Class Noted - Resolved POA    * (Principal) Acute metabolic encephalopathy [facility]-24-FC: G93.41  ICD-9-CM: 348.31  2/24/2022 - Present Yes        UTI (urinary tract infection) ICD-10-CM: N39.0  ICD-9-CM: 599.0  2/24/2022 - Present Yes        Leukocytosis ICD-10-CM: D72.829  ICD-9-CM: 288.60  2/24/2022 - Present Yes        Normocytic anemia ICD-10-CM: D64.9  ICD-9-CM: 285.9  2/24/2022 - Present Yes        Late onset Alzheimer's disease without behavioral disturbance (HCC) ICD-10-CM: G30.1, F02.80  ICD-9-CM: 331.0, 294.10  8/4/2020 - Present Yes        Peripheral vascular disease ([facility] [address] ICD-10-CM: I73.9  ICD-9-CM: 443.9  6/14/2020 - Present Yes              Objective:     Patient Vitals for the past 24 hrs:   Temp Pulse Resp BP SpO2   02/28/22 0742 97.4 °F (36.3 °C) 64 16 (!) 153/72 95 %   02/28/22 0235 99 °F (37.2 °C) 68 18 106/67 98 %   02/27/22 2248 99 °F (37.2 °C) 79 18 101/78 96 %   02/27/22 1919 99 °F (37.2 °C) 60 18 (!) 144/65 96 %   02/27/22 1543 98.5 °F (36.9 °C) 70 18 (!) 176/66 98 %   02/27/22 1147 97.9 °F (36.6 °C) 63 18 (!) 175/70 95 %     Oxygen Therapy  O2 Sat (%): 95 % (02/28/22 0742)  Pulse via Oximetry: 60 beats per minute (02/24/22 1850)  O2 Device: None (Room air) (02/28/22 0753)    Estimated body mass index is 20.19 kg/m² as calculated from the following:    Height as of this encounter: 6' (1.829 m). Weight as of this encounter: 67.5 kg (148 lb 14.4 oz).     Intake/Output Summary (Last 24 hours) at 2/28/2022 1000  Last data filed at 2/27/2022 2248  Gross per 24 hour   Intake --   Output 2225 ml   Net -2225 ml         Physical Exam:     Blood pressure (!) 153/72, pulse 64, temperature 97.4 °F (36.3 °C), resp. rate 16, height 6' (1.829 m), weight 67.5 kg (148 lb 14.4 oz), SpO2 95 %. General:          Well nourished.  No overt distress. Appears chronically ill.  nonverbal this morning  Head:               Normocephalic, atraumatic  Eyes:               Sclerae appear normal.  Pupils equally round. ENT:                Nares appear normal, no drainage.  Moist oral mucosa  Neck:               No restricted ROM.  Trachea midline   CV:                  RRR.  No m/r/g.  No jugular venous distension. Lungs:             CTAB.  No wheezing, rhonchi, or rales.  Respirations even, unlabored.  Right clavicular deformity palpated. Abdomen:        Bowel sounds present.  Soft, non-tender (no grimacing with palpation), nondistended. Extremities:     No cyanosis or clubbing.  No edema. Rigid LE with ROM.    Skin:                No rashes and normal coloration.   Warm and dry.    Neuro:             CN II-XII grossly intact.   Sensation intact.  Alert, eyes open, non verbal this morning   Psych:             Normal mood and affect.      I have reviewed ordered lab tests and independently visualized imaging below:    Recent Labs:  Recent Results (from the past 48 hour(s))   CBC WITH AUTOMATED DIFF    Collection Time: 02/27/22  5:29 AM   Result Value Ref Range    WBC 8.8 4.3 - 11.1 K/uL    RBC 4.12 (L) 4.23 - 5.6 M/uL    HGB 11.1 (L) 13.6 - 17.2 g/dL    HCT 35.9 (L) 41.1 - 50.3 %    MCV 87.1 79.6 - 97.8 FL    MCH 26.9 26.1 - 32.9 PG    MCHC 30.9 (L) 31.4 - 35.0 g/dL    RDW 15.6 (H) 11.9 - 14.6 %    PLATELET 125 592 - 763 K/uL    MPV 10.5 9.4 - 12.3 FL    ABSOLUTE NRBC 0.00 0.0 - 0.2 K/uL    DF AUTOMATED      NEUTROPHILS 75 43 - 78 %    LYMPHOCYTES 11 (L) 13 - 44 %    MONOCYTES 7 4.0 - 12.0 %    EOSINOPHILS 7 0.5 - 7.8 %    BASOPHILS 1 0.0 - 2.0 %    IMMATURE GRANULOCYTES 0 0.0 - 5.0 %    ABS. NEUTROPHILS 6.6 1.7 - 8.2 K/UL    ABS. LYMPHOCYTES 1.0 0.5 - 4.6 K/UL    ABS.  MONOCYTES 0.6 0.1 - 1.3 K/UL ABS. EOSINOPHILS 0.6 0.0 - 0.8 K/UL    ABS. BASOPHILS 0.0 0.0 - 0.2 K/UL    ABS. IMM. GRANS. 0.0 0.0 - 0.5 K/UL   METABOLIC PANEL, COMPREHENSIVE    Collection Time: 02/27/22  5:29 AM   Result Value Ref Range    Sodium 144 136 - 145 mmol/L    Potassium 3.7 3.5 - 5.1 mmol/L    Chloride 111 (H) 98 - 107 mmol/L    CO2 29 21 - 32 mmol/L    Anion gap 4 (L) 7 - 16 mmol/L    Glucose 99 65 - 100 mg/dL    BUN 18 8 - 23 MG/DL    Creatinine 0.98 0.8 - 1.5 MG/DL    GFR est AA >60 >60 ml/min/1.73m2    GFR est non-AA >60 >60 ml/min/1.73m2    Calcium 8.5 8.3 - 10.4 MG/DL    Bilirubin, total 0.3 0.2 - 1.1 MG/DL    ALT (SGPT) 16 12 - 65 U/L    AST (SGOT) 16 15 - 37 U/L    Alk. phosphatase 85 50 - 136 U/L    Protein, total 5.7 (L) 6.3 - 8.2 g/dL    Albumin 2.7 (L) 3.2 - 4.6 g/dL    Globulin 3.0 2.3 - 3.5 g/dL    A-G Ratio 0.9 (L) 1.2 - 3.5     CBC WITH AUTOMATED DIFF    Collection Time: 02/28/22  6:08 AM   Result Value Ref Range    WBC 9.6 4.3 - 11.1 K/uL    RBC 4.09 (L) 4.23 - 5.6 M/uL    HGB 11.2 (L) 13.6 - 17.2 g/dL    HCT 35.2 (L) 41.1 - 50.3 %    MCV 86.1 79.6 - 97.8 FL    MCH 27.4 26.1 - 32.9 PG    MCHC 31.8 31.4 - 35.0 g/dL    RDW 15.6 (H) 11.9 - 14.6 %    PLATELET 787 125 - 963 K/uL    MPV 10.4 9.4 - 12.3 FL    ABSOLUTE NRBC 0.00 0.0 - 0.2 K/uL    DF AUTOMATED      NEUTROPHILS 74 43 - 78 %    LYMPHOCYTES 12 (L) 13 - 44 %    MONOCYTES 7 4.0 - 12.0 %    EOSINOPHILS 6 0.5 - 7.8 %    BASOPHILS 0 0.0 - 2.0 %    IMMATURE GRANULOCYTES 0 0.0 - 5.0 %    ABS. NEUTROPHILS 7.1 1.7 - 8.2 K/UL    ABS. LYMPHOCYTES 1.2 0.5 - 4.6 K/UL    ABS. MONOCYTES 0.7 0.1 - 1.3 K/UL    ABS. EOSINOPHILS 0.6 0.0 - 0.8 K/UL    ABS. BASOPHILS 0.0 0.0 - 0.2 K/UL    ABS. IMM.  GRANS. 0.0 0.0 - 0.5 K/UL   METABOLIC PANEL, COMPREHENSIVE    Collection Time: 02/28/22  6:08 AM   Result Value Ref Range    Sodium 143 136 - 145 mmol/L    Potassium 3.9 3.5 - 5.1 mmol/L    Chloride 111 (H) 98 - 107 mmol/L    CO2 27 21 - 32 mmol/L    Anion gap 5 (L) 7 - 16 mmol/L Glucose 93 65 - 100 mg/dL    BUN 23 8 - 23 MG/DL    Creatinine 1.04 0.8 - 1.5 MG/DL    GFR est AA >60 >60 ml/min/1.73m2    GFR est non-AA >60 >60 ml/min/1.73m2    Calcium 8.4 8.3 - 10.4 MG/DL    Bilirubin, total 0.4 0.2 - 1.1 MG/DL    ALT (SGPT) 17 12 - 65 U/L    AST (SGOT) 17 15 - 37 U/L    Alk. phosphatase 92 50 - 136 U/L    Protein, total 5.8 (L) 6.3 - 8.2 g/dL    Albumin 2.7 (L) 3.2 - 4.6 g/dL    Globulin 3.1 2.3 - 3.5 g/dL    A-G Ratio 0.9 (L) 1.2 - 3.5         All Micro Results     Procedure Component Value Units Date/Time    CULTURE, URINE [[id number]] Collected: 02/24/22 1949    Order Status: Completed Specimen: Urine from Clean catch Updated: 02/28/22 0732     Special Requests: NO SPECIAL REQUESTS        Culture result:       50,000-100,000 COLONIES/mL MIXED SKIN SHASTA ISOLATED          CULTURE, BLOOD [[id number]] Collected: 02/24/22 1526    Order Status: Completed Specimen: Blood Updated: 02/28/22 0729     Special Requests: LEFT ANTECUBITAL        Culture result: NO GROWTH 4 DAYS       COVID-19 RAPID TEST [[id number]] Collected: 02/24/22 1850    Order Status: Completed Specimen: Nasopharyngeal Updated: 02/24/22 1918     Specimen source Nasopharyngeal        COVID-19 rapid test Not detected        Comment:      The specimen is NEGATIVE for SARS-CoV-2, the novel coronavirus associated with COVID-19. A negative result does not rule out COVID-19. This test has been authorized by the FDA under an Emergency Use Authorization (EUA) for use by authorized laboratories. Fact sheet for Healthcare Providers: ConventionUpdate.co.nz  Fact sheet for Patients: ConventionUpdate.co.nz       Methodology: Isothermal Nucleic Acid Amplification               Other Studies:  No results found.     Current Meds:  Current Facility-Administered Medications   Medication Dose Route Frequency    amLODIPine (NORVASC) tablet 10 mg  10 mg Oral DAILY    hydrALAZINE (APRESOLINE) 20 mg/mL injection 20 mg  20 mg IntraVENous Q6H PRN    apixaban (ELIQUIS) tablet 5 mg  5 mg Oral BID    aspirin delayed-release tablet 81 mg  81 mg Oral DAILY    donepeziL (ARICEPT) tablet 10 mg  10 mg Oral DAILY    nitroglycerin (NITROSTAT) tablet 0.4 mg  0.4 mg SubLINGual PRN    isosorbide mononitrate ER (IMDUR) tablet 30 mg  30 mg Oral QHS    memantine (NAMENDA) tablet 15 mg  15 mg Oral QHS    sertraline (ZOLOFT) tablet 50 mg  50 mg Oral QHS    atorvastatin (LIPITOR) tablet 20 mg  20 mg Oral QHS    sodium chloride (NS) flush 5-40 mL  5-40 mL IntraVENous Q8H    sodium chloride (NS) flush 5-40 mL  5-40 mL IntraVENous PRN    acetaminophen (TYLENOL) tablet 650 mg  650 mg Oral Q6H PRN    Or    acetaminophen (TYLENOL) suppository 650 mg  650 mg Rectal Q6H PRN    polyethylene glycol (MIRALAX) packet 17 g  17 g Oral DAILY PRN    ondansetron (ZOFRAN ODT) tablet 4 mg  4 mg Oral Q8H PRN    Or    ondansetron (ZOFRAN) injection 4 mg  4 mg IntraVENous Q6H PRN    0.9% sodium chloride infusion  100 mL/hr IntraVENous CONTINUOUS       Signed:  Fozia Dover NP

## 2022-02-28 NOTE — PROGRESS NOTES
Problem: Pressure Injury - Risk of  Goal: *Prevention of pressure injury  Description: Document Jose Scale and appropriate interventions in the flowsheet. Outcome: Progressing Towards Goal  Note: Pressure Injury Interventions:  Sensory Interventions: Assess changes in LOC,Avoid rigorous massage over bony prominences,Keep linens dry and wrinkle-free,Minimize linen layers,Turn and reposition approx. every two hours (pillows and wedges if needed)    Moisture Interventions: Absorbent underpads,Check for incontinence Q2 hours and as needed,Contain wound drainage,Maintain skin hydration (lotion/cream),Minimize layers,Moisture barrier    Activity Interventions: PT/OT evaluation    Mobility Interventions: Float heels,HOB 30 degrees or less,Turn and reposition approx. every two hours(pillow and wedges)    Nutrition Interventions: Document food/fluid/supplement intake,Offer support with meals,snacks and hydration    Friction and Shear Interventions: Apply protective barrier, creams and emollients,Foam dressings/transparent film/skin sealants,HOB 30 degrees or less,Minimize layers                Problem: Patient Education: Go to Patient Education Activity  Goal: Patient/Family Education  Outcome: Progressing Towards Goal     Problem: Falls - Risk of  Goal: *Absence of Falls  Description: Document Trevon Fall Risk and appropriate interventions in the flowsheet.   Outcome: Progressing Towards Goal  Note: Fall Risk Interventions:  Mobility Interventions: Bed/chair exit alarm,Communicate number of staff needed for ambulation/transfer    Mentation Interventions: Adequate sleep, hydration, pain control,Bed/chair exit alarm,Door open when patient unattended,Family/sitter at [employer] frequent rounding,Reorient patient,Room close to nurse's station,Toileting rounds    Medication Interventions: Bed/chair exit alarm,Patient to call before getting OOB    Elimination Interventions: Bed/chair exit alarm,Call light in reach,Toileting schedule/hourly rounds    History of Falls Interventions: Bed/chair exit alarm,Room close to nurse's station         Problem: Patient Education: Go to Patient Education Activity  Goal: Patient/Family Education  Outcome: Progressing Towards Goal     Problem: Patient Education: Go to Patient Education Activity  Goal: Patient/Family Education  Outcome: Progressing Towards Goal     Problem: Patient Education: Go to Patient Education Activity  Goal: Patient/Family Education  Outcome: Progressing Towards Goal     Problem: Urinary Tract Infection - Adult  Goal: *Absence of infection signs and symptoms  Outcome: Progressing Towards Goal     Problem: Patient Education: Go to Patient Education Activity  Goal: Patient/Family Education  Outcome: Progressing Towards Goal     Problem: General Medical Care Plan  Goal: *Vital signs within specified parameters  Outcome: Progressing Towards Goal  Goal: *Labs within defined limits  Outcome: Progressing Towards Goal  Goal: *Absence of infection signs and symptoms  Outcome: Progressing Towards Goal  Goal: *Optimal pain control at patient's stated goal  Outcome: Progressing Towards Goal  Goal: *Skin integrity maintained  Outcome: Progressing Towards Goal  Goal: *Fluid volume balance  Outcome: Progressing Towards Goal  Goal: *Optimize nutritional status  Outcome: Progressing Towards Goal  Goal: *Anxiety reduced or absent  Outcome: Progressing Towards Goal  Goal: *Progressive mobility and function (eg: ADL's)  Outcome: Progressing Towards Goal     Problem: Patient Education: Go to Patient Education Activity  Goal: Patient/Family Education  Outcome: Progressing Towards Goal

## 2022-02-28 NOTE — PROGRESS NOTES
Problem: Mobility Impaired (Adult and Pediatric)  Goal: *Acute Goals and Plan of Care (Insert Text)  Note: STG:  (1.)Mr. Littlejohn will move from supine to sit and sit to supine  with MINIMAL ASSIST within 7 treatment day(s). (2.)Mr. Littlejohn will transfer from bed to chair and chair to bed with MINIMAL ASSIST using the least restrictive device within 7 treatment day(s). (3.)Mr. Littlejohn will ambulate with MINIMAL ASSIST for 25 feet with the least restrictive device within 7 treatment day(s). (4.)Pt. will increase B LE strength 1/2 grade within 7 days  (5.)Pt. will ambulate up/down ramp with appropriate assistive device and min assist within 7 days      ________________________________________________________________________________________________      PHYSICAL THERAPY: Daily Note and AM 2/28/2022  INPATIENT: PT Visit Days : 4  Payor: [employer] / Plan: [employer] / Product Type: [employer] /       NAME/AGE/GENDER: Loreta Ga is a 80 y.o. male   PRIMARY DIAGNOSIS: Acute metabolic encephalopathy [A13.48] Acute metabolic encephalopathy Acute metabolic encephalopathy       ICD-10: Treatment Diagnosis:    · Generalized Muscle Weakness (M62.81)  · Other lack of cordination (R27.8)  · Other abnormalities of gait and mobility (R26.89)  · History of falling (Z91.81)   Precaution/Allergies:  Penicillins      ASSESSMENT:     Mr. Oma Styles presents with acute metabolic encephalopathy and is experiencing a decline in his premorbid level of function. He is very lethargic today. He would benefit from further PT while here as he is able ozzy participate to address these deficits: decreased general strength and endurance, standing balance and functional mobility. He would benefit from further rehab in STR  to maximize his level of function to return home. This afternoon, he is very lethargic. His wife is present and answers my social questions.  He is unable to follow commands for exs, but is able to hold his LEs against gravity. Once he sits on edge of bed, he becomes more alert and opens eyes. He stands but is unable to take steps for me upon my command. He does bear weight through B LEs. He is returned to bed and bed alarm activated. 2/28 supine upon arrival.  His wife is present. She stated he ate breakfast and fell back to sleep. Attempted to sit up, but therapist could not keep him awake, long enough to follow commands. Performs PROM to B UE/LE. Left in supine with needs in reach and wife is there to call if she needs assist.    This section established at most recent assessment   PROBLEM LIST (Impairments causing functional limitations):  1. Decreased Strength  2. Decreased ADL/Functional Activities  3. Decreased Transfer Abilities  4. Decreased Ambulation Ability/Technique  5. Decreased Balance  6. Decreased Activity Tolerance  7. Increased Fatigue  8. Decreased Flexibility/Joint Mobility  9. Decreased [place] with Home Exercise Program  10. Decreased Cognition   INTERVENTIONS PLANNED: (Benefits and precautions of physical therapy have been discussed with the patient.)  1. Balance Exercise  2. Bed Mobility  3. Gait Training  4. Range of Motion (ROM)  5. Therapeutic Activites  6. Therapeutic Exercise/Strengthening  7. Transfer Training     TREATMENT PLAN: Frequency/Duration: daily for duration of hospital stay  Rehabilitation Potential For Stated Goals: [facility] (at time of discharge pending progress):    Placement: It is my opinion, based on this patient's performance to date, that Mr. Shannon Parrish may benefit from intensive therapy at a [address] [place] [address] after discharge due to the functional deficits listed above that are likely to improve with skilled rehabilitation and concerns that he/she may be unsafe to be unsupervised at home due to the need for maximal assistance for functional mobility .   Equipment:    Will assess at  a later time              HISTORY: History of Present Injury/Illness (Reason for Referral):  Sustained a fall. Acute metabolic encephalopathy  Past Medical History/Comorbidities:   Mr. Margarita Moon  has a past medical history of Axonal neuropathy (2020), Late onset Alzheimer's disease without behavioral disturbance ([facility] [address] (2020), Reactive depression (2020), and Unsteady gait (3/16/2021). Mr. Margarita Moon  has no past surgical history on file. Social History/Living Environment:   Home Environment: Private residence  # Steps to Enter: 0  Wheelchair Ramp: Yes  One/Two Story Residence: Two story, live on 1st floor  Living Alone: No  Support Systems: Spouse/Significant Other,Other Family Member(s) (gr dtr)  Patient Expects to be Discharged to[de-identified] Rehab Unit Subacute  Current DME Used/Available at Home: Walker, rolling  Tub or Shower Type: Shower  Prior Level of Function/Work/Activity:  Functionally I with bathing, wife assisted with dressing. He ambulated without a device in the home  Dominant Side:         RIGHT   Number of Personal Factors/Comorbidities that affect the Plan of Care: 1-2: MODERATE COMPLEXITY   EXAMINATION:   Most Recent Physical Functioning:   Gross Assessment:                  Posture:     Balance:    Bed Mobility:  Rolling:  (rolling from L><R to get up in the bed)  Supine to Sit:  (attempted, but could not wake up)  Wheelchair Mobility:     Transfers:  Duration: 30 Minutes  Gait:               Body Structures Involved:  1. Metabolic  2. Joints  3. Muscles Body Functions Affected:  1. Mental  2. Neuromusculoskeletal  3. Movement Related  4. Metobolic/Endocrine Activities and Participation Affected:  1. Learning and Applying Knowledge  2. General Tasks and Demands  3. Mobility  4.  Self Care   Number of elements that affect the Plan of Care: 4+: HIGH COMPLEXITY   CLINICAL PRESENTATION:   Presentation: Evolving clinical presentation with changing clinical characteristics: MODERATE COMPLEXITY   CLINICAL DECISION MAKIN [facility] [address] [address] AM-PAC 6 Clicks   Basic Mobility Inpatient Short Form  How much difficulty does the patient currently have. .. Unable A Lot A Little None   1. Turning over in bed (including adjusting bedclothes, sheets and blankets)? [] 1   [x] 2   [] 3   [] 4   2. Sitting down on and standing up from a chair with arms ( e.g., wheelchair, bedside commode, etc.)   [] 1   [x] 2   [] 3   [] 4   3. Moving from lying on back to sitting on the side of the bed? [] 1   [x] 2   [] 3   [] 4   How much help from another person does the patient currently need. .. Total A Lot A Little None   4. Moving to and from a bed to a chair (including a wheelchair)? [] 1   [x] 2   [] 3   [] 4   5. Need to walk in hospital room? [x] 1   [] 2   [] 3   [] 4   6. Climbing 3-5 steps with a railing? [x] 1   [] 2   [] 3   [] 4   © 2007, Trustees of [address] [place] [address], under license to [employer]. All rights reserved      Score:  Initial: 10 Most Recent: X (Date: -- )    Interpretation of Tool:  Represents activities that are increasingly more difficult (i.e. Bed mobility, Transfers, Gait). Medical Necessity:     · Patient demonstrates   · fair  ·  rehab potential due to higher previous functional level. Reason for Services/Other Comments:  · Patient   · continues to require present interventions due to patient's inability to perform functional mobility at a safe minimal assist level  · . Use of outcome tool(s) and clinical judgement create a POC that gives a: Questionable prediction of patient's progress: MODERATE COMPLEXITY            TREATMENT:   (In addition to Assessment/Re-Assessment sessions the following treatments were rendered)   Pre-treatment Symptoms/Complaints:  More awake  Pain: Initial:      Post Session:       Gait Training ( ):  Gait training to improve and/or restore physical functioning as related to mobility. Ambulated   with   using a   and minimal   A with verbal cues.   Therapeutic Activity: (  30 Minutes ): Therapeutic activities including Bed transfers to improve strength and balance. Required minimal        Date:  2/27 Date:  2/28   Date:     Activity/Exercise Parameters Parameters Parameters   Ankle pumps 10 aa 12 P    heelslides 10 aa 12 P    Hip ab/ad 10 aa 12 P    SAQ  12 P    SLR  12 P    Shoulder flex/ext  12 P    Elbow flex/ext  12 P    Wrist flex/ext  12 P    Push/Pull  12 P                Braces/Orthotics/Lines/Etc:   · IV  · purewick  · O2 Device: None (Room air)  Treatment/Session Assessment:    · Response to Treatment:  Very sleepy today. · Interdisciplinary Collaboration:   o Physical Therapy Assistant  o Registered Nurse  · After treatment position/precautions:   o Supine in bed  o Bed alarm/tab alert on  o Bed/Chair-wheels locked  o Bed in low position  o Call light within reach  o RN notified  o Family at bedside  o Side rails x 3   · Compliance with Program/Exercises: Will assess as treatment progresses  · Recommendations/Intent for next treatment session: \"Next visit will focus on advancements to more challenging activities and reduction in assistance provided\".   Total Treatment Duration:  PT Patient Time In/Time Out  Time In: 0830  Time Out: 0900    Leidy Albarran, PTA

## 2022-02-28 NOTE — PROGRESS NOTES
[address] offered STR bed. Theresa Ellis spoke with dtr and confirmed this was amelia choice. Noemi at [address] Claudio notified to start precert. CLAUDIO following for insurance authorization and transfer.    CHARISSE Cazares

## 2022-02-28 NOTE — PROGRESS NOTES
Problem: Pressure Injury - Risk of  Goal: *Prevention of pressure injury  Description: Document Jose Scale and appropriate interventions in the flowsheet. Outcome: Progressing Towards Goal  Note: Pressure Injury Interventions:  Sensory Interventions: Assess changes in LOC,Avoid rigorous massage over bony prominences,Keep linens dry and wrinkle-free,Minimize linen layers,Turn and reposition approx. every two hours (pillows and wedges if needed)    Moisture Interventions: Absorbent underpads,Check for incontinence Q2 hours and as needed,Contain wound drainage,Maintain skin hydration (lotion/cream),Minimize layers,Moisture barrier    Activity Interventions: PT/OT evaluation    Mobility Interventions: Float heels,HOB 30 degrees or less,Turn and reposition approx.  every two hours(pillow and wedges)    Nutrition Interventions: Document food/fluid/supplement intake,Offer support with meals,snacks and hydration    Friction and Shear Interventions: Apply protective barrier, creams and emollients,Foam dressings/transparent film/skin sealants,HOB 30 degrees or less,Minimize layers

## 2022-03-01 VITALS
WEIGHT: 148.9 LBS | DIASTOLIC BLOOD PRESSURE: 63 MMHG | TEMPERATURE: 98.1 F | HEIGHT: 72 IN | OXYGEN SATURATION: 97 % | HEART RATE: 60 BPM | BODY MASS INDEX: 20.17 KG/M2 | RESPIRATION RATE: 20 BRPM | SYSTOLIC BLOOD PRESSURE: 154 MMHG

## 2022-03-01 LAB
ALBUMIN SERPL-MCNC: 2.9 G/DL (ref 3.2–4.6)
ALBUMIN/GLOB SERPL: 0.9 {RATIO} (ref 1.2–3.5)
ALP SERPL-CCNC: 91 U/L (ref 50–136)
ALT SERPL-CCNC: 14 U/L (ref 12–65)
ANION GAP SERPL CALC-SCNC: 1 MMOL/L (ref 7–16)
AST SERPL-CCNC: 14 U/L (ref 15–37)
BACTERIA SPEC CULT: NORMAL
BASOPHILS # BLD: 0.1 K/UL (ref 0–0.2)
BASOPHILS NFR BLD: 1 % (ref 0–2)
BILIRUB SERPL-MCNC: 0.4 MG/DL (ref 0.2–1.1)
BUN SERPL-MCNC: 22 MG/DL (ref 8–23)
CALCIUM SERPL-MCNC: 9 MG/DL (ref 8.3–10.4)
CHLORIDE SERPL-SCNC: 110 MMOL/L (ref 98–107)
CO2 SERPL-SCNC: 29 MMOL/L (ref 21–32)
COVID-19 RAPID TEST, COVR: NOT DETECTED
CREAT SERPL-MCNC: 0.91 MG/DL (ref 0.8–1.5)
DIFFERENTIAL METHOD BLD: ABNORMAL
EOSINOPHIL # BLD: 0.7 K/UL (ref 0–0.8)
EOSINOPHIL NFR BLD: 7 % (ref 0.5–7.8)
ERYTHROCYTE [DISTWIDTH] IN BLOOD BY AUTOMATED COUNT: 15.6 % (ref 11.9–14.6)
GLOBULIN SER CALC-MCNC: 3.1 G/DL (ref 2.3–3.5)
GLUCOSE SERPL-MCNC: 95 MG/DL (ref 65–100)
HCT VFR BLD AUTO: 36.3 % (ref 41.1–50.3)
HGB BLD-MCNC: 11.4 G/DL (ref 13.6–17.2)
IMM GRANULOCYTES # BLD AUTO: 0.1 K/UL (ref 0–0.5)
IMM GRANULOCYTES NFR BLD AUTO: 1 % (ref 0–5)
LYMPHOCYTES # BLD: 1.1 K/UL (ref 0.5–4.6)
LYMPHOCYTES NFR BLD: 11 % (ref 13–44)
MCH RBC QN AUTO: 27 PG (ref 26.1–32.9)
MCHC RBC AUTO-ENTMCNC: 31.4 G/DL (ref 31.4–35)
MCV RBC AUTO: 85.8 FL (ref 79.6–97.8)
MONOCYTES # BLD: 0.6 K/UL (ref 0.1–1.3)
MONOCYTES NFR BLD: 6 % (ref 4–12)
NEUTS SEG # BLD: 7.7 K/UL (ref 1.7–8.2)
NEUTS SEG NFR BLD: 75 % (ref 43–78)
NRBC # BLD: 0 K/UL (ref 0–0.2)
PLATELET # BLD AUTO: 208 K/UL (ref 150–450)
PMV BLD AUTO: 10.6 FL (ref 9.4–12.3)
POTASSIUM SERPL-SCNC: 3.8 MMOL/L (ref 3.5–5.1)
PROT SERPL-MCNC: 6 G/DL (ref 6.3–8.2)
RBC # BLD AUTO: 4.23 M/UL (ref 4.23–5.6)
SERVICE CMNT-IMP: NORMAL
SODIUM SERPL-SCNC: 140 MMOL/L (ref 136–145)
SOURCE, COVRS: NORMAL
WBC # BLD AUTO: 10.2 K/UL (ref 4.3–11.1)

## 2022-03-01 PROCEDURE — 74011000250 HC RX REV CODE- 250: Performed by: FAMILY MEDICINE

## 2022-03-01 PROCEDURE — 80053 COMPREHEN METABOLIC PANEL: CPT

## 2022-03-01 PROCEDURE — 36415 COLL VENOUS BLD VENIPUNCTURE: CPT

## 2022-03-01 PROCEDURE — 87635 SARS-COV-2 COVID-19 AMP PRB: CPT

## 2022-03-01 PROCEDURE — 74011250637 HC RX REV CODE- 250/637: Performed by: FAMILY MEDICINE

## 2022-03-01 PROCEDURE — 85025 COMPLETE CBC W/AUTO DIFF WBC: CPT

## 2022-03-01 PROCEDURE — 74011250636 HC RX REV CODE- 250/636: Performed by: FAMILY MEDICINE

## 2022-03-01 PROCEDURE — 97530 THERAPEUTIC ACTIVITIES: CPT

## 2022-03-01 PROCEDURE — 74011250637 HC RX REV CODE- 250/637: Performed by: NURSE PRACTITIONER

## 2022-03-01 RX ORDER — AMLODIPINE BESYLATE 10 MG/1
10 TABLET ORAL DAILY
Qty: 30 TABLET | Refills: 0 | Status: SHIPPED
Start: 2022-03-02 | End: 2022-04-01

## 2022-03-01 RX ADMIN — SODIUM CHLORIDE, PRESERVATIVE FREE 10 ML: 5 INJECTION INTRAVENOUS at 05:02

## 2022-03-01 RX ADMIN — SODIUM CHLORIDE, PRESERVATIVE FREE 10 ML: 5 INJECTION INTRAVENOUS at 14:00

## 2022-03-01 RX ADMIN — DONEPEZIL HYDROCHLORIDE 10 MG: 5 TABLET, FILM COATED ORAL at 09:00

## 2022-03-01 RX ADMIN — Medication 81 MG: at 09:00

## 2022-03-01 RX ADMIN — SODIUM CHLORIDE 100 ML/HR: 900 INJECTION, SOLUTION INTRAVENOUS at 09:37

## 2022-03-01 RX ADMIN — APIXABAN 5 MG: 5 TABLET, FILM COATED ORAL at 09:00

## 2022-03-01 RX ADMIN — AMLODIPINE BESYLATE 10 MG: 10 TABLET ORAL at 09:00

## 2022-03-01 NOTE — PROGRESS NOTES
Visited at request of PT assistant, who had observed that pt's wife was tearful yesterday. Wife was present for visit and receptive to 's visit. Pt responded to 's voice and readily took 's hand to shake it. Listened, as Mrs. Littlejohn reflected on the events that led to pt's hospitalization. She also talked about their life, noting that their family and their [id number] [facility] are central sources of encouragement/help. (They are long-time members at [address] [place] [address].)   Conveyed care/concern for both pt and wife. Prayer offered at bedside. Chaplains to remain available, as needed to provide spiritual/emotional support.     Katharina Stearns MDiv, [address] [place] [address]

## 2022-03-01 NOTE — PROGRESS NOTES
Patient was approved for rehab today and CM met with patient wife and she is in agreement with d/c to rehab and okay to d/c before the 4 hours for the IM letter as she agrees to d/c. Important Letter from Medicare provided to the patient/representative. Oral explanation was provided, and all questions answered. Signed document placed in the medical record. Copy to patient/representative.

## 2022-03-01 NOTE — PROGRESS NOTES
Care Management Interventions  PCP Verified by CM: Yes (Dr. Jose Enrique Gee )  Mode of Transport at Discharge: BLS  Transition of Care Consult (CM Consult): SNF  Partner SNF: Yes  Discharge Durable Medical Equipment: No  Physical Therapy Consult: Yes  Occupational Therapy Consult: Yes  Speech Therapy Consult: No  Support Systems: Spouse/Significant Other  Confirm Follow Up Transport: Family  The Plan for Transition of Care is Related to the Following Treatment Goals : Go to rehab at [facility]  The Patient and/or Patient Representative was Provided with a Choice of Provider and Agrees with the Discharge Plan?: Yes  Name of the Patient Representative Who was Provided with a Choice of Provider and Agrees with the Discharge Plan: daughter/wife  Freedom of Choice List was Provided with Basic Dialogue that Supports the Patient's Individualized Plan of Care/Goals, Treatment Preferences and Shares the Quality Data Associated with the Providers?: Yes  Discharge Location  Patient Expects to be Discharged to[de-identified] Skilled nursing facility  Patient has been approved for rehab by insurance and is medically stable for d/c. Patient to d/c to [facility] room 336A report to [phone]. Family in agreement with d/c plan.  Packet sent with patient with [facility] ambulance transport and 4 pm.

## 2022-03-01 NOTE — PROGRESS NOTES
Hospitalist Progress Note   Admit Date:  2022  2:59 PM   Name:  Jennifer Taylor   Age:  80 y.o. Sex:  male  :  6/10/1932   MRN:  [id number]   Room:  [facility]/    Presenting Complaint: Fall    Reason(s) for Admission: Acute metabolic encephalopathy [Q83.10]     Hospital Course & Interval History:    79 yo male with PMH of alzheimer's disease, depression, aflutter who presented with worsening confusion X3-4 days.  Pt was found in the floor at home without evidence of trauma.  Found to have UTI. Started on rocephin. Urine cx not sent.   CT head and cspine without acute findings.  CXR showed mildly displaced oblique fx right clavicular diaphysis, no evidence of underlying pneumo. Ortho consulted, no intervention.  Pelvic xray neg for acute findings.  Pt lives with elderly wife and granddaughter who works during the day. Waiting on SNF placement, [facility]-; precert started . Subjective/24hr Events (22): In bed, eating breakfast wife is feeding him. Smiling. ROS:  10 systems unable to obtain due to mentation. Assessment & Plan:     Acute metabolic [id number] ()    Likely secondary to UTI  Improving per wife  Baseline mentation is answering \"yes and no\"  Continue abx, IVF   had episode of combativeness this morning, wife was able to settle him down  Encouraged blinds open, lights on, awake during the day, sleep at night, PT/OT involved  Would like to avoid pharmacological intervention if possible.  resolved, at baseline per wife       Late onset Alzheimer's disease without behavioral disturbance (HCC) (2020)    Home meds       UTI (urinary tract infection) (2022)  Continue rocephin  Order urine cx   NGTD   stop rocephin, urine cx NGTD.  Received 5 doses of rocephin     Afib  Home eliquis     Clavicle fx  Consult Ortho  No intervention per Ortho  Sling to RUE     HTN   Start norvasc   increase norvasc, add prn hydralazine    Better control with BP, continue with current regimen.             Dispo/Discharge Planning:      SNF, [facility] or [facility], CM assisting.     Diet:  ADULT DIET Regular  DVT PPx: eliquis  Code status: Full Code    Hospital Problems as of 3/1/2022 Date Reviewed: 9/23/2021          Codes Class Noted - Resolved POA    * (Principal) Acute metabolic encephalopathy SJC-60-BR: G93.41  ICD-9-CM: 348.31  2/24/2022 - Present Yes        UTI (urinary tract infection) ICD-10-CM: N39.0  ICD-9-CM: 599.0  2/24/2022 - Present Yes        Leukocytosis ICD-10-CM: D72.829  ICD-9-CM: 288.60  2/24/2022 - Present Yes        Normocytic anemia ICD-10-CM: D64.9  ICD-9-CM: 285.9  2/24/2022 - Present Yes        Late onset Alzheimer's disease without behavioral disturbance (HCC) ICD-10-CM: G30.1, F02.80  ICD-9-CM: 331.0, 294.10  8/4/2020 - Present Yes        Peripheral vascular disease ([facility] [address] ICD-10-CM: I73.9  ICD-9-CM: 443.9  6/14/2020 - Present Yes              Objective:     Patient Vitals for the past 24 hrs:   Temp Pulse Resp BP SpO2   03/01/22 0739 98.5 °F (36.9 °C) 60 22 (!) 155/73 96 %   03/01/22 0326 98.2 °F (36.8 °C) 63 18 136/67 95 %   02/28/22 2340 98 °F (36.7 °C) 60 18 (!) 146/55 95 %   02/28/22 1937 98 °F (36.7 °C) 63 18 (!) 146/57 95 %   02/28/22 1521 97.9 °F (36.6 °C) 67 18 (!) 155/57 95 %   02/28/22 1158 97.9 °F (36.6 °C) 60 22 (!) 150/66 95 %     Oxygen Therapy  O2 Sat (%): 96 % (03/01/22 0739)  Pulse via Oximetry: 60 beats per minute (02/24/22 1850)  O2 Device: None (Room air) (03/01/22 2997)    Estimated body mass index is 20.19 kg/m² as calculated from the following:    Height as of this encounter: 6' (1.829 m). Weight as of this encounter: 67.5 kg (148 lb 14.4 oz). Intake/Output Summary (Last 24 hours) at 3/1/2022 1104  Last data filed at 3/1/2022 0607  Gross per 24 hour   Intake 2728 ml   Output 2750 ml   Net -22 ml         Physical Exam:     Blood pressure (!) 155/73, pulse 60, temperature 98.5 °F (36.9 °C), resp.  rate 22, height 6' (1.829 m), weight 67.5 kg (148 lb 14.4 oz), SpO2 96 %. General:          Well nourished.  No overt distress. Appears chronically ill.  nonverbal this morning  Head:               Normocephalic, atraumatic  Eyes:               Sclerae appear normal.  Pupils equally round. ENT:                Nares appear normal, no drainage.  Moist oral mucosa  Neck:               No restricted ROM.  Trachea midline   CV:                  RRR.  No m/r/g.  No jugular venous distension. Lungs:             CTAB.  No wheezing, rhonchi, or rales.  Respirations even, unlabored.  Right clavicular deformity palpated. Abdomen:        Bowel sounds present.  Soft, non-tender (no grimacing with palpation), nondistended. Extremities:     No cyanosis or clubbing.  No edema. Rigid LE with ROM.    Skin:                No rashes and normal coloration.   Warm and dry.    Neuro:             CN II-XII grossly intact.   Sensation intact.  Alert, eyes open, non verbal this morning   Psych:             Normal mood and affect.      I have reviewed ordered lab tests and independently visualized imaging below:    Recent Labs:  Recent Results (from the past 48 hour(s))   CBC WITH AUTOMATED DIFF    Collection Time: 02/28/22  6:08 AM   Result Value Ref Range    WBC 9.6 4.3 - 11.1 K/uL    RBC 4.09 (L) 4.23 - 5.6 M/uL    HGB 11.2 (L) 13.6 - 17.2 g/dL    HCT 35.2 (L) 41.1 - 50.3 %    MCV 86.1 79.6 - 97.8 FL    MCH 27.4 26.1 - 32.9 PG    MCHC 31.8 31.4 - 35.0 g/dL    RDW 15.6 (H) 11.9 - 14.6 %    PLATELET 173 757 - 272 K/uL    MPV 10.4 9.4 - 12.3 FL    ABSOLUTE NRBC 0.00 0.0 - 0.2 K/uL    DF AUTOMATED      NEUTROPHILS 74 43 - 78 %    LYMPHOCYTES 12 (L) 13 - 44 %    MONOCYTES 7 4.0 - 12.0 %    EOSINOPHILS 6 0.5 - 7.8 %    BASOPHILS 0 0.0 - 2.0 %    IMMATURE GRANULOCYTES 0 0.0 - 5.0 %    ABS. NEUTROPHILS 7.1 1.7 - 8.2 K/UL    ABS. LYMPHOCYTES 1.2 0.5 - 4.6 K/UL    ABS. MONOCYTES 0.7 0.1 - 1.3 K/UL    ABS. EOSINOPHILS 0.6 0.0 - 0.8 K/UL    ABS. BASOPHILS 0.0 0.0 - 0.2 K/UL    ABS. IMM. GRANS. 0.0 0.0 - 0.5 K/UL   METABOLIC PANEL, COMPREHENSIVE    Collection Time: 02/28/22  6:08 AM   Result Value Ref Range    Sodium 143 136 - 145 mmol/L    Potassium 3.9 3.5 - 5.1 mmol/L    Chloride 111 (H) 98 - 107 mmol/L    CO2 27 21 - 32 mmol/L    Anion gap 5 (L) 7 - 16 mmol/L    Glucose 93 65 - 100 mg/dL    BUN 23 8 - 23 MG/DL    Creatinine 1.04 0.8 - 1.5 MG/DL    GFR est AA >60 >60 ml/min/1.73m2    GFR est non-AA >60 >60 ml/min/1.73m2    Calcium 8.4 8.3 - 10.4 MG/DL    Bilirubin, total 0.4 0.2 - 1.1 MG/DL    ALT (SGPT) 17 12 - 65 U/L    AST (SGOT) 17 15 - 37 U/L    Alk. phosphatase 92 50 - 136 U/L    Protein, total 5.8 (L) 6.3 - 8.2 g/dL    Albumin 2.7 (L) 3.2 - 4.6 g/dL    Globulin 3.1 2.3 - 3.5 g/dL    A-G Ratio 0.9 (L) 1.2 - 3.5     CBC WITH AUTOMATED DIFF    Collection Time: 03/01/22  6:29 AM   Result Value Ref Range    WBC 10.2 4.3 - 11.1 K/uL    RBC 4.23 4.23 - 5.6 M/uL    HGB 11.4 (L) 13.6 - 17.2 g/dL    HCT 36.3 (L) 41.1 - 50.3 %    MCV 85.8 79.6 - 97.8 FL    MCH 27.0 26.1 - 32.9 PG    MCHC 31.4 31.4 - 35.0 g/dL    RDW 15.6 (H) 11.9 - 14.6 %    PLATELET 722 486 - 307 K/uL    MPV 10.6 9.4 - 12.3 FL    ABSOLUTE NRBC 0.00 0.0 - 0.2 K/uL    DF AUTOMATED      NEUTROPHILS 75 43 - 78 %    LYMPHOCYTES 11 (L) 13 - 44 %    MONOCYTES 6 4.0 - 12.0 %    EOSINOPHILS 7 0.5 - 7.8 %    BASOPHILS 1 0.0 - 2.0 %    IMMATURE GRANULOCYTES 1 0.0 - 5.0 %    ABS. NEUTROPHILS 7.7 1.7 - 8.2 K/UL    ABS. LYMPHOCYTES 1.1 0.5 - 4.6 K/UL    ABS. MONOCYTES 0.6 0.1 - 1.3 K/UL    ABS. EOSINOPHILS 0.7 0.0 - 0.8 K/UL    ABS. BASOPHILS 0.1 0.0 - 0.2 K/UL    ABS. IMM.  GRANS. 0.1 0.0 - 0.5 K/UL   METABOLIC PANEL, COMPREHENSIVE    Collection Time: 03/01/22  6:29 AM   Result Value Ref Range    Sodium 140 136 - 145 mmol/L    Potassium 3.8 3.5 - 5.1 mmol/L    Chloride 110 (H) 98 - 107 mmol/L    CO2 29 21 - 32 mmol/L    Anion gap 1 (L) 7 - 16 mmol/L    Glucose 95 65 - 100 mg/dL    BUN 22 8 - 23 MG/DL Creatinine 0.91 0.8 - 1.5 MG/DL    GFR est AA >60 >60 ml/min/1.73m2    GFR est non-AA >60 >60 ml/min/1.73m2    Calcium 9.0 8.3 - 10.4 MG/DL    Bilirubin, total 0.4 0.2 - 1.1 MG/DL    ALT (SGPT) 14 12 - 65 U/L    AST (SGOT) 14 (L) 15 - 37 U/L    Alk. phosphatase 91 50 - 136 U/L    Protein, total 6.0 (L) 6.3 - 8.2 g/dL    Albumin 2.9 (L) 3.2 - 4.6 g/dL    Globulin 3.1 2.3 - 3.5 g/dL    A-G Ratio 0.9 (L) 1.2 - 3.5         All Micro Results     Procedure Component Value Units Date/Time    CULTURE, BLOOD [[id number]] Collected: 02/24/22 1526    Order Status: Completed Specimen: Blood Updated: 03/01/22 0124     Special Requests: LEFT ANTECUBITAL        Culture result: NO GROWTH 5 DAYS       CULTURE, URINE [[id number]] Collected: 02/24/22 1949    Order Status: Completed Specimen: Urine from Clean catch Updated: 02/28/22 0732     Special Requests: NO SPECIAL REQUESTS        Culture result:       50,000-100,000 COLONIES/mL MIXED SKIN SHASTA ISOLATED          COVID-19 RAPID TEST [[id number]] Collected: 02/24/22 1850    Order Status: Completed Specimen: Nasopharyngeal Updated: 02/24/22 1918     Specimen source Nasopharyngeal        COVID-19 rapid test Not detected        Comment:      The specimen is NEGATIVE for SARS-CoV-2, the novel coronavirus associated with COVID-19. A negative result does not rule out COVID-19. This test has been authorized by the FDA under an Emergency Use Authorization (EUA) for use by authorized laboratories. Fact sheet for Healthcare Providers: ConventionUpdate.co.nz  Fact sheet for Patients: ConventionUpdate.co.nz       Methodology: Isothermal Nucleic Acid Amplification               Other Studies:  No results found.     Current Meds:  Current Facility-Administered Medications   Medication Dose Route Frequency    amLODIPine (NORVASC) tablet 10 mg  10 mg Oral DAILY    hydrALAZINE (APRESOLINE) 20 mg/mL injection 20 mg  20 mg IntraVENous Q6H PRN    apixaban (ELIQUIS) tablet 5 mg  5 mg Oral BID    aspirin delayed-release tablet 81 mg  81 mg Oral DAILY    donepeziL (ARICEPT) tablet 10 mg  10 mg Oral DAILY    nitroglycerin (NITROSTAT) tablet 0.4 mg  0.4 mg SubLINGual PRN    isosorbide mononitrate ER (IMDUR) tablet 30 mg  30 mg Oral QHS    memantine (NAMENDA) tablet 15 mg  15 mg Oral QHS    sertraline (ZOLOFT) tablet 50 mg  50 mg Oral QHS    atorvastatin (LIPITOR) tablet 20 mg  20 mg Oral QHS    sodium chloride (NS) flush 5-40 mL  5-40 mL IntraVENous Q8H    sodium chloride (NS) flush 5-40 mL  5-40 mL IntraVENous PRN    acetaminophen (TYLENOL) tablet 650 mg  650 mg Oral Q6H PRN    Or    acetaminophen (TYLENOL) suppository 650 mg  650 mg Rectal Q6H PRN    polyethylene glycol (MIRALAX) packet 17 g  17 g Oral DAILY PRN    ondansetron (ZOFRAN ODT) tablet 4 mg  4 mg Oral Q8H PRN    Or    ondansetron (ZOFRAN) injection 4 mg  4 mg IntraVENous Q6H PRN    0.9% sodium chloride infusion  100 mL/hr IntraVENous CONTINUOUS       Signed:  Micael Simmonds, NP

## 2022-03-01 NOTE — PROGRESS NOTES
Problem: Mobility Impaired (Adult and Pediatric)  Goal: *Acute Goals and Plan of Care (Insert Text)  Note: STG:  (1.)Mr. Littlejohn will move from supine to sit and sit to supine  with MINIMAL ASSIST within 7 treatment day(s). (2.)Mr. Littlejohn will transfer from bed to chair and chair to bed with MINIMAL ASSIST using the least restrictive device within 7 treatment day(s). (3.)Mr. Littlejohn will ambulate with MINIMAL ASSIST for 25 feet with the least restrictive device within 7 treatment day(s). (4.)Pt. will increase B LE strength 1/2 grade within 7 days  (5.)Pt. will ambulate up/down ramp with appropriate assistive device and min assist within 7 days      ________________________________________________________________________________________________      PHYSICAL THERAPY: Daily Note and PM 3/1/2022  INPATIENT: PT Visit Days : 5  Payor: Nuzhat Hughes / Plan: [employer] / Product Type: [employer] /       NAME/AGE/GENDER: Fanta Grayson is a 80 y.o. male   PRIMARY DIAGNOSIS: Acute metabolic encephalopathy [C85.33] Acute metabolic encephalopathy Acute metabolic encephalopathy       ICD-10: Treatment Diagnosis:    · Generalized Muscle Weakness (M62.81)  · Other lack of cordination (R27.8)  · Other abnormalities of gait and mobility (R26.89)  · History of falling (Z91.81)   Precaution/Allergies:  Penicillins      ASSESSMENT:     Mr. Susan Collins presents with acute metabolic encephalopathy and is experiencing a decline in his premorbid level of function. He is very lethargic today. He would benefit from further PT while here as he is able ozzy participate to address these deficits: decreased general strength and endurance, standing balance and functional mobility. He would benefit from further rehab in STR  to maximize his level of function to return home. This afternoon, he is very lethargic. His wife is present and answers my social questions.  He is unable to follow commands for exs, but is able to hold his LEs against gravity. Once he sits on edge of bed, he becomes more alert and opens eyes. He stands but is unable to take steps for me upon my command. He does bear weight through B LEs. He is returned to bed and bed alarm activated. 2/28 supine upon arrival.  His wife is present. She stated he ate breakfast and fell back to sleep. Attempted to sit up, but therapist could not keep him awake, long enough to follow commands. Performs PROM to B UE/LE. Left in supine with needs in reach and wife is there to call if she needs assist.  3/1 supine upon arrival.  Help pt get fix in the bed to perform exercises. Therapist help pt performs B UE/LE exercises with AAROM. Left in supine with needs in reach, alarm on and instructed to call for assist.  He is leaving today for rehab. 333    This section established at most recent assessment   PROBLEM LIST (Impairments causing functional limitations):  1. Decreased Strength  2. Decreased ADL/Functional Activities  3. Decreased Transfer Abilities  4. Decreased Ambulation Ability/Technique  5. Decreased Balance  6. Decreased Activity Tolerance  7. Increased Fatigue  8. Decreased Flexibility/Joint Mobility  9. Decreased [place] with Home Exercise Program  10. Decreased Cognition   INTERVENTIONS PLANNED: (Benefits and precautions of physical therapy have been discussed with the patient.)  1. Balance Exercise  2. Bed Mobility  3. Gait Training  4. Range of Motion (ROM)  5. Therapeutic Activites  6. Therapeutic Exercise/Strengthening  7. Transfer Training     TREATMENT PLAN: Frequency/Duration: daily for duration of hospital stay  Rehabilitation Potential For Stated Goals: [facility] (at time of discharge pending progress):    Placement:   It is my opinion, based on this patient's performance to date, that Mr. Scott Andersen may benefit from intensive therapy at a [address] [place] [address] after discharge due to the functional deficits listed above that are likely to improve with skilled rehabilitation and concerns that he/she may be unsafe to be unsupervised at home due to the need for maximal assistance for functional mobility . Equipment:    Will assess at  a later time              HISTORY:   History of Present Injury/Illness (Reason for Referral):  Sustained a fall. Acute metabolic encephalopathy  Past Medical History/Comorbidities:   Mr. 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 Mr. Patti Garza  has no past surgical history on file. Social History/Living Environment:   Home Environment: Private residence  # Steps to Enter: 0  Wheelchair Ramp: Yes  One/Two Story Residence: Two story, live on 1st floor  Living Alone: No  Support Systems: Spouse/Significant Other  Patient Expects to be Discharged to[de-identified] Skilled nursing facility  Current DME Used/Available at Home: Will Klippel, rolling  Tub or Shower Type: Shower  Prior Level of Function/Work/Activity:  Functionally I with bathing, wife assisted with dressing. He ambulated without a device in the home  Dominant Side:         RIGHT   Number of Personal Factors/Comorbidities that affect the Plan of Care: 1-2: MODERATE COMPLEXITY   EXAMINATION:   Most Recent Physical Functioning:   Gross Assessment:                  Posture:     Balance:    Bed Mobility:  Rolling: Minimum assistance (to get up in the bed with verbal cues)  Wheelchair Mobility:     Transfers:  Duration: 25 Minutes  Gait:               Body Structures Involved:  1. Metabolic  2. Joints  3. Muscles Body Functions Affected:  1. Mental  2. Neuromusculoskeletal  3. Movement Related  4. Metobolic/Endocrine Activities and Participation Affected:  1. Learning and Applying Knowledge  2. General Tasks and Demands  3. Mobility  4.  Self Care   Number of elements that affect the Plan of Care: 4+: HIGH COMPLEXITY   CLINICAL PRESENTATION:   Presentation: Evolving clinical presentation with changing clinical characteristics: MODERATE COMPLEXITY   CLINICAL DECISION MAKIN [facility] Mobility Inpatient Short Form  How much difficulty does the patient currently have. .. Unable A Lot A Little None   1. Turning over in bed (including adjusting bedclothes, sheets and blankets)? [] 1   [x] 2   [] 3   [] 4   2. Sitting down on and standing up from a chair with arms ( e.g., wheelchair, bedside commode, etc.)   [] 1   [x] 2   [] 3   [] 4   3. Moving from lying on back to sitting on the side of the bed? [] 1   [x] 2   [] 3   [] 4   How much help from another person does the patient currently need. .. Total A Lot A Little None   4. Moving to and from a bed to a chair (including a wheelchair)? [] 1   [x] 2   [] 3   [] 4   5. Need to walk in hospital room? [x] 1   [] 2   [] 3   [] 4   6. Climbing 3-5 steps with a railing? [x] 1   [] 2   [] 3   [] 4   © , Trustees of [employer], under license to [employer]. All rights reserved      Score:  Initial: 10 Most Recent: X (Date: -- )    Interpretation of Tool:  Represents activities that are increasingly more difficult (i.e. Bed mobility, Transfers, Gait). Medical Necessity:     · Patient demonstrates   · fair  ·  rehab potential due to higher previous functional level. Reason for Services/Other Comments:  · Patient   · continues to require present interventions due to patient's inability to perform functional mobility at a safe minimal assist level  · .    Use of outcome tool(s) and clinical judgement create a POC that gives a: Questionable prediction of patient's progress: MODERATE COMPLEXITY            TREATMENT:   (In addition to Assessment/Re-Assessment sessions the following treatments were rendered)   Pre-treatment Symptoms/Complaints:  More awake  Pain: Initial:      Post Session:       Gait Training ( ):  Gait training to improve and/or restore physical functioning as related to mobility. Ambulated   with   using a   and minimal   A with verbal cues. Therapeutic Activity: (  25 Minutes ):  Therapeutic activities including Bed transfers to improve strength and balance. Required minimal        Date:  2/27 Date:  2/28   Date:  3/1     Activity/Exercise Parameters Parameters Parameters   Ankle pumps 10 aa 12 P 12 aa   heelslides 10 aa 12 P 12 aa   Hip ab/ad 10 aa 12 P 12 aa   SAQ  12 P 12 aa   SLR  12 P 12 aa   Shoulder flex/ext  12 P 12 aa   Elbow flex/ext  12 P 12 aa   Wrist flex/ext  12 P 12 aa   Push/Pull  12 P 12 aa               Braces/Orthotics/Lines/Etc:   · IV  · purewick  · O2 Device: None (Room air)  Treatment/Session Assessment:    · Response to Treatment:  More awake today, going to rehab today  · Interdisciplinary Collaboration:   o Physical Therapy Assistant  o Registered Nurse  · After treatment position/precautions:   o Supine in bed  o Bed alarm/tab alert on  o Bed/Chair-wheels locked  o Bed in low position  o Call light within reach  o RN notified  o Family at bedside  o Side rails x 3   · Compliance with Program/Exercises: Will assess as treatment progresses  · Recommendations/Intent for next treatment session: \"Next visit will focus on advancements to more challenging activities and reduction in assistance provided\".   Total Treatment Duration:  PT Patient Time In/Time Out  Time In: 1400  Time Out: [address] Colleen Albarran PTA

## 2022-03-01 NOTE — PROGRESS NOTES
END OF SHIFT NOTE:    Intake/Output  02/28 1901 - 03/01 0700  In: 1981 [P.O.:240; I.V.:1741]  Out: 600 [Urine:600]   Voiding: YES  Catheter: YES;external cath  Drain:   External Urinary Catheter 03/01/22 (Active)   Site Assessment Clean, dry, & intact 03/01/22 0323   Repositioned Yes 03/01/22 0323   Perineal Care Yes 03/01/22 0323   Wick Changed Yes 03/01/22 0323   Suction Canister/Tubing Changed No 03/01/22 0323   Urine Output (mL) 400 ml 03/01/22 0346               Stool:  0 occurrences. Emesis:  0 occurrences. VITAL SIGNS  Patient Vitals for the past 12 hrs:   Temp Pulse Resp BP SpO2   03/01/22 0326 98.2 °F (36.8 °C) 63 18 136/67 95 %   02/28/22 2340 98 °F (36.7 °C) 60 18 (!) 146/55 95 %   02/28/22 1937 98 °F (36.7 °C) 63 18 (!) 146/57 95 %       Pain Assessment  Pain 1  Pain Scale 1: Visual (03/01/22 0120)  Pain Intensity 1: 0 (03/01/22 0120)  Patient Stated Pain Goal: Unable to verbalize/indicate pain (03/01/22 0120)  Pain Reassessment 1: Yes (02/26/22 1305)  Pain Intervention(s) 1: Repositioned; Rest (02/27/22 1944)    Ambulating  No    Additional Information:   Turned/repositioned q2. Possible d/c today. Shift report given to oncoming nurse JAMEL Chaves at the bedside.     Gisselle Saavedra RN

## 2022-03-01 NOTE — PROGRESS NOTES
END OF SHIFT NOTE:    Intake/Output  No intake/output data recorded. Voiding: YES  Catheter: YES external Drain:   External Urinary Catheter 02/25/22 (Active)   Site Assessment Clean, dry, & intact 02/28/22 0446   Repositioned Yes 02/28/22 0446   Perineal Care Yes 02/28/22 0446   Wick Changed Yes 02/28/22 0446   Suction Canister/Tubing Changed No 02/28/22 0446   Urine Output (mL) 300 ml 02/28/22 1839               Stool:  0 occurrences. Emesis:  0 occurrences. VITAL SIGNS  Patient Vitals for the past 12 hrs:   Temp Pulse Resp BP SpO2   02/28/22 1937 98 °F (36.7 °C) 63 18 (!) 146/57 95 %   02/28/22 1521 97.9 °F (36.6 °C) 67 18 (!) 155/57 95 %   02/28/22 1158 97.9 °F (36.6 °C) 60 22 (!) 150/66 95 %       Pain Assessment  Pain 1  Pain Scale 1: FLACC (02/28/22 0750)  Pain Intensity 1: 0 (02/28/22 0750)  Patient Stated Pain Goal: 1 (02/27/22 2014)  Pain Reassessment 1: Yes (02/26/22 1305)  Pain Intervention(s) 1: Repositioned; Rest (02/27/22 1944)    Ambulating  No    Additional Information:     Shift report given to oncoming nurse at the bedside.     Valerie Suh RN

## 2022-03-01 NOTE — DISCHARGE SUMMARY
Hospitalist Discharge Summary   Admit Date:  2022  2:59 PM   DC Note date: 3/1/2022  Name:  Berry Blake   Age:  80 y.o. Sex:  male  :  6/10/1932   MRN:  [id number]   Room:  [facility]  PCP:  Sherren Harris, MD    Presenting Complaint: Fall    Initial Admission Diagnosis: Acute metabolic encephalopathy [O64.34]     Problem List for this Hospitalization:  Hospital Problems as of 3/1/2022 Date Reviewed: 2021          Codes Class Noted - Resolved POA    * (Principal) Acute metabolic encephalopathy SVG-27-AC: G93.41  ICD-9-CM: 348.31  2022 - Present Yes        UTI (urinary tract infection) ICD-10-CM: N39.0  ICD-9-CM: 599.0  2022 - Present Yes        Leukocytosis ICD-10-CM: D72.829  ICD-9-CM: 288.60  2022 - Present Yes        Normocytic anemia ICD-10-CM: D64.9  ICD-9-CM: 285.9  2022 - Present Yes        Late [facility]'s [facility] without [facility] ICD-10-CM: G30.1, F02.80  ICD-9-CM: 331.0, 294.10  2020 - Present Yes        Peripheral vascular disease ([facility] ICD-10-CM: I73.9  ICD-9-CM: 443.9  2020 - Present Yes            Did Patient have Sepsis (YES OR NO): No    Hospital Course:  81 yo male with PMH of alzheimer's disease, depression, aflutter who presented with worsening confusion X3-4 days.  Pt was found in the floor at home without evidence of trauma.  Found to have UTI. Started on rocephin. Urine cx not sent.   CT head and cspine without acute findings.  CXR showed mildly displaced oblique fx right clavicular diaphysis, no evidence of underlying pneumo. Ortho consulted, no intervention.  Pelvic xray neg for acute findings.  Pt lives with elderly wife and granddaughter who works during the day. tx for UTI finished. He has been accepted at [facility] and ready for discharge at this time. Disposition: Skilled Nursing Facility  Diet: ADULT DIET Regular  Code Status: Full Code    Follow Up Orders:   Follow-up Appointments   Procedures    FOLLOW UP VISIT Appointment in: 3 - 7 [facility] MD     Standing Status:   Standing     Number of Occurrences:   1     Order Specific Question:   Appointment in     Answer:   3 - 5 Days       Follow-up Information     Follow up With Specialties Details Why Contact Info    [facility] [place]) [address]   [address] [facility]  [phone]    [place], [address]   [address] [place] [address] [phone]            Follow up labs/diagnostics (ultimately defer to outpatient provider):  Per facility    Time spent in patient discharge and coordination 40 minutes. Plan was discussed with nursing, CM. All questions answered. Patient was stable at time of discharge. Instructions given to call a physician or return if any concerns. Discharge Info:   Current Discharge Medication List      START taking these medications    Details   amLODIPine (NORVASC) 10 mg tablet Take 1 Tablet by mouth daily for 30 days. Qty: 30 Tablet, Refills: 0  Start date: 3/2/2022, End date: 4/1/2022         CONTINUE these medications which have NOT CHANGED    Details   multivitamin (ONE A DAY) tablet Take 1 Tablet by mouth daily. ascorbic acid, vitamin C, (Vitamin C) 500 mg tablet Take 500 mg by mouth daily. sertraline (ZOLOFT) 50 mg tablet TAKE 1/2 A TABLET BY MOUTH AT BEDTIME FOR 2 WEEKS THEN INCREASE TO 1 TABLET AT BEDTIME  Qty: 90 Tablet, Refills: 3    Associated Diagnoses: Late onset Alzheimer's disease without behavioral disturbance ([facility] Reactive depression      donepeziL (ARICEPT) 10 mg tablet Take 1 Tablet by mouth daily. Qty: 90 Tablet, Refills: 3    Associated Diagnoses: Late onset Alzheimer's disease without behavioral disturbance ([facility])      memantine (NAMENDA) 10 mg tablet Take 1 Tablet by mouth two (2) times a day.   Qty: 180 Tablet, Refills: 3    Associated Diagnoses: Late onset Alzheimer's disease without behavioral disturbance ([facility])      cyanocobalamin (VITAMIN B-12) 1,000 mcg sublingual tablet 1 tablet daily under the tongue dissolve  Qty: 90 Tab, Refills: 3    Associated Diagnoses: B12 deficiency      apixaban (ELIQUIS) 5 mg tablet Take 5 mg by mouth two (2) times a day. cholecalciferol (VITAMIN D3) (1000 Units /25 mcg) tablet Take 1,000 Units by mouth. aspirin delayed-release 81 mg tablet Take 81 mg by mouth daily. simvastatin (ZOCOR) 40 mg tablet Take 20 mg by mouth nightly. isosorbide mononitrate ER (IMDUR) 60 mg CR tablet Take 30 mg by mouth nightly. nitroglycerin (NITROSTAT) 0.4 mg SL tablet Take 0.4 mg by mouth as needed. Procedures done this admission:  * No surgery found *    Consults this admission:  None    Echocardiogram/EKG results:  No results found for this or any previous visit. EKG Results     Procedure 720 Value Units Date/Time    EKG 12 LEAD INITIAL [[id number]]     Order Status: Sent     EKG, 12 LEAD, INITIAL [[id number]]     Order Status: Completed           Diagnostic Imaging/Tests:   XR PELV AP ONLY    Result Date: 2/24/2022  Pelvis INDICATION: Leg pain after fall A supine view of the pelvis was obtained. There is no evidence of hip fracture or dislocation. Bony pelvis also appears intact. There is moderate vascular calcification. No acute findings    CT HEAD WO CONT    Result Date: 2/24/2022  CT of the Head and Cervical Spine INDICATION:  Fall, altered mental status Multiple axial images obtained through the brain without intravenous contrast. High resolution axial images were then obtained through the cervical spine. Coronal and sagittal reformats were also evaluated. Radiation dose reduction techniques were used for this study: All CT scans performed at this facility use one or all of the following: Automated exposure control, adjustment of the mA and/or kVp according to patient's size, iterative reconstruction.  Head CT:  No areas of abnormal attenuation are seen in the brain. There is no CT evidence of acute hemorrhage or infarction. The ventricles are normal in size. There are no extra-axial fluid collections. No masses are seen. There are no bony lesions. Cervical Spine CT: There is mild multilevel degenerative disc disease. There is no evidence of fracture or subluxation. No bony lesions are seen. There is no prevertebral soft tissue swelling. There is bilateral carotid atherosclerosis. 1.  No CT evidence of acute intracranial abnormality. 2.  No evidence of cervical spine fracture      CT SPINE CERV WO CONT    Result Date: 2/24/2022  CT of the Head and Cervical Spine INDICATION:  Fall, altered mental status Multiple axial images obtained through the brain without intravenous contrast. High resolution axial images were then obtained through the cervical spine. Coronal and sagittal reformats were also evaluated. Radiation dose reduction techniques were used for this study: All CT scans performed at this facility use one or all of the following: Automated exposure control, adjustment of the mA and/or kVp according to patient's size, iterative reconstruction. Head CT:  No areas of abnormal attenuation are seen in the brain. There is no CT evidence of acute hemorrhage or infarction. The ventricles are normal in size. There are no extra-axial fluid collections. No masses are seen. There are no bony lesions. Cervical Spine CT: There is mild multilevel degenerative disc disease. There is no evidence of fracture or subluxation. No bony lesions are seen. There is no prevertebral soft tissue swelling. There is bilateral carotid atherosclerosis. 1.  No CT evidence of acute intracranial abnormality. 2.  No evidence of cervical spine fracture      XR CHEST PORT    Result Date: 2/24/2022  EXAMINATION: CHEST RADIOGRAPH 2/24/2022 3:27 PM ACCESSION NUMBER: [id number] INDICATION: AMS/confusion COMPARISON: Chest x-ray 9/12/2007.  TECHNIQUE: A single view of the chest was obtained. 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 Pacemaker with right atrial and right ventricular leads. Lungs: Minimal linear opacity along the left heart border. Pleura: No pleural effusion. No pneumothorax. Osseous Structures: Mildly displaced right clavicular fracture Upper Abdomen: Unremarkable. 1. There is a mildly displaced oblique fracture of the right clavicular diaphysis. There is no evidence of underlying pneumothorax. 2. Trace atelectasis along the left heart border. 3. No evidence of a focal pneumonia. All Micro Results     Procedure Component Value Units Date/Time    COVID-19 RAPID TEST [[id number]] Collected: 03/01/22 1434    Order Status: Completed Updated: 03/01/22 1439    CULTURE, BLOOD [[id number]] Collected: 02/24/22 1526    Order Status: Completed Specimen: Blood Updated: 03/01/22 0124     Special Requests: LEFT ANTECUBITAL        Culture result: NO GROWTH 5 DAYS       CULTURE, URINE [[id number]] Collected: 02/24/22 1949    Order Status: Completed Specimen: Urine from Clean catch Updated: 02/28/22 0732     Special Requests: NO SPECIAL REQUESTS        Culture result:       50,000-100,000 COLONIES/mL MIXED SKIN SHASTA ISOLATED          COVID-19 RAPID TEST [[id number]] Collected: 02/24/22 1850    Order Status: Completed Specimen: Nasopharyngeal Updated: 02/24/22 1918     Specimen source Nasopharyngeal        COVID-19 rapid test Not detected        Comment:      The specimen is NEGATIVE for SARS-CoV-2, the novel coronavirus associated with COVID-19. A negative result does not rule out COVID-19. This test has been authorized by the FDA under an Emergency Use Authorization (EUA) for use by authorized laboratories.         Fact sheet for Healthcare Providers: ConventionUpdate.co.nz  Fact sheet for Patients: ConventionUpdate.co.nz Methodology: Isothermal Nucleic Acid Amplification               Labs: Results:       BMP, Mg, Phos Recent Labs     03/01/22  0629 02/28/22  0608 02/27/22  0529    143 144   K 3.8 3.9 3.7   * 111* 111*   CO2 29 27 29   AGAP 1* 5* 4*   BUN 22 23 18   CREA 0.91 1.04 0.98   CA 9.0 8.4 8.5   GLU 95 93 99      CBC Recent Labs     03/01/22  0629 02/28/22  0608 02/27/22  0529   WBC 10.2 9.6 8.8   RBC 4.23 4.09* 4.12*   HGB 11.4* 11.2* 11.1*   HCT 36.3* 35.2* 35.9*    212 219   GRANS 75 74 75   LYMPH 11* 12* 11*   EOS 7 6 7   MONOS 6 7 7   BASOS 1 0 1   IG 1 0 0   ANEU 7.7 7.1 6.6   ABL 1.1 1.2 1.0   ZAIN 0.7 0.6 0.6   ABM 0.6 0.7 0.6   ABB 0.1 0.0 0.0   AIG 0.1 0.0 0.0      LFT Recent Labs     03/01/22  0629 02/28/22  0608 02/27/22  0529   ALT 14 17 16   AP 91 92 85   TP 6.0* 5.8* 5.7*   ALB 2.9* 2.7* 2.7*   GLOB 3.1 3.1 3.0   AGRAT 0.9* 0.9* 0.9*      Cardiac Testing No results found for: BNPP, BNP, CPK, RCK1, RCK2, RCK3, RCK4, CKMB, CKNDX, CKND1, TROPT, TROIQ   Coagulation Tests No results found for: PTP, INR, APTT, INREXT   A1c No results found for: HBA1C, QPP3VNEK   Lipid Panel No results found for: CHOL, CHOLPOCT, CHOLX, CHLST, CHOLV, 380453, HDL, HDLP, LDL, LDLC, DLDLP, 404846, VLDLC, VLDL, TGLX, TRIGL, TRIGP, TGLPOCT, CHHD, [facility]   Thyroid Panel Lab Results   Component Value Date/Time    TSH 1.020 02/24/2022 03:27 PM    TSH 1.970 08/27/2020 10:34 AM    T4, Free 1.05 08/27/2020 10:34 AM        Most Recent UA Lab Results   Component Value Date/Time    Color YELLOW 02/24/2022 07:49 PM    Appearance CLEAR 02/24/2022 07:49 PM    Specific gravity 1.008 02/24/2022 07:49 PM    pH (UA) 6.0 02/24/2022 07:49 PM    Protein Negative 02/24/2022 07:49 PM    Glucose Negative 02/24/2022 07:49 PM    Ketone Negative 02/24/2022 07:49 PM    Bilirubin Negative 02/24/2022 07:49 PM    Blood LARGE (A) 02/24/2022 07:49 PM    Urobilinogen 0.2 02/24/2022 07:49 PM    Nitrites Negative 02/24/2022 07:49 PM    Leukocyte Esterase SMALL (A) 02/24/2022 07:49 PM    WBC 10-20 02/24/2022 07:49 PM    RBC >100 (H) 02/24/2022 07:49 PM    Epithelial cells 0-3 02/24/2022 07:49 PM    Bacteria 0 02/24/2022 07:49 PM    Casts 0 02/24/2022 07:49 PM          All Labs from Last 24 Hrs:  Recent Results (from the past 24 hour(s))   CBC WITH AUTOMATED DIFF    Collection Time: 03/01/22  6:29 AM   Result Value Ref Range    WBC 10.2 4.3 - 11.1 K/uL    RBC 4.23 4.23 - 5.6 M/uL    HGB 11.4 (L) 13.6 - 17.2 g/dL    HCT 36.3 (L) 41.1 - 50.3 %    MCV 85.8 79.6 - 97.8 FL    MCH 27.0 26.1 - 32.9 PG    MCHC 31.4 31.4 - 35.0 g/dL    RDW 15.6 (H) 11.9 - 14.6 %    PLATELET 586 573 - 403 K/uL    MPV 10.6 9.4 - 12.3 FL    ABSOLUTE NRBC 0.00 0.0 - 0.2 K/uL    DF AUTOMATED      NEUTROPHILS 75 43 - 78 %    LYMPHOCYTES 11 (L) 13 - 44 %    MONOCYTES 6 4.0 - 12.0 %    EOSINOPHILS 7 0.5 - 7.8 %    BASOPHILS 1 0.0 - 2.0 %    IMMATURE GRANULOCYTES 1 0.0 - 5.0 %    ABS. NEUTROPHILS 7.7 1.7 - 8.2 K/UL    ABS. LYMPHOCYTES 1.1 0.5 - 4.6 K/UL    ABS. MONOCYTES 0.6 0.1 - 1.3 K/UL    ABS. EOSINOPHILS 0.7 0.0 - 0.8 K/UL    ABS. BASOPHILS 0.1 0.0 - 0.2 K/UL    ABS. IMM. GRANS. 0.1 0.0 - 0.5 K/UL   METABOLIC PANEL, COMPREHENSIVE    Collection Time: 03/01/22  6:29 AM   Result Value Ref Range    Sodium 140 136 - 145 mmol/L    Potassium 3.8 3.5 - 5.1 mmol/L    Chloride 110 (H) 98 - 107 mmol/L    CO2 29 21 - 32 mmol/L    Anion gap 1 (L) 7 - 16 mmol/L    Glucose 95 65 - 100 mg/dL    BUN 22 8 - 23 MG/DL    Creatinine 0.91 0.8 - 1.5 MG/DL    GFR est AA >60 >60 ml/min/1.73m2    GFR est non-AA >60 >60 ml/min/1.73m2    Calcium 9.0 8.3 - 10.4 MG/DL    Bilirubin, total 0.4 0.2 - 1.1 MG/DL    ALT (SGPT) 14 12 - 65 U/L    AST (SGOT) 14 (L) 15 - 37 U/L    Alk.  phosphatase 91 50 - 136 U/L    Protein, total 6.0 (L) 6.3 - 8.2 g/dL    Albumin 2.9 (L) 3.2 - 4.6 g/dL    Globulin 3.1 2.3 - 3.5 g/dL    A-G Ratio 0.9 (L) 1.2 - 3.5         Current Med List in Hospital:   Current Facility-Administered Medications Medication Dose Route Frequency    amLODIPine (NORVASC) tablet 10 mg  10 mg Oral DAILY    hydrALAZINE (APRESOLINE) 20 mg/mL injection 20 mg  20 mg IntraVENous Q6H PRN    apixaban (ELIQUIS) tablet 5 mg  5 mg Oral BID    aspirin delayed-release tablet 81 mg  81 mg Oral DAILY    donepeziL (ARICEPT) tablet 10 mg  10 mg Oral DAILY    nitroglycerin (NITROSTAT) tablet 0.4 mg  0.4 mg SubLINGual PRN    isosorbide mononitrate ER (IMDUR) tablet 30 mg  30 mg Oral QHS    memantine (NAMENDA) tablet 15 mg  15 mg Oral QHS    sertraline (ZOLOFT) tablet 50 mg  50 mg Oral QHS    atorvastatin (LIPITOR) tablet 20 mg  20 mg Oral QHS    sodium chloride (NS) flush 5-40 mL  5-40 mL IntraVENous Q8H    sodium chloride (NS) flush 5-40 mL  5-40 mL IntraVENous PRN    acetaminophen (TYLENOL) tablet 650 mg  650 mg Oral Q6H PRN    Or    acetaminophen (TYLENOL) suppository 650 mg  650 mg Rectal Q6H PRN    polyethylene glycol (MIRALAX) packet 17 g  17 g Oral DAILY PRN    ondansetron (ZOFRAN ODT) tablet 4 mg  4 mg Oral Q8H PRN    Or    ondansetron (ZOFRAN) injection 4 mg  4 mg IntraVENous Q6H PRN    0.9% sodium chloride infusion  100 mL/hr IntraVENous CONTINUOUS       Allergies   Allergen Reactions    Penicillins Hives     Immunization History   Administered Date(s) Administered    COVID-19, Pfizer Purple top, DILUTE for use, 12+ yrs, 30mcg/0.3mL dose 01/26/2021, 02/25/2021, 10/23/2021    Influenza High Dose Vaccine PF 10/23/2021    Influenza, Quadrivalent, Adjuvanted (>65 Yrs FLUAD QUAD [id number]) 09/10/2020    TB Skin Test (PPD) Intradermal 02/25/2022    Tdap 04/29/2021       Recent Vital Data:  Patient Vitals for the past 24 hrs:   Temp Pulse Resp BP SpO2   03/01/22 1147 98.3 °F (36.8 °C) 64 24 (!) 164/75 97 %   03/01/22 0739 98.5 °F (36.9 °C) 60 22 (!) 155/73 96 %   03/01/22 0326 98.2 °F (36.8 °C) 63 18 136/67 95 %   02/28/22 2340 98 °F (36.7 °C) 60 18 (!) 146/55 95 %   02/28/22 1937 98 °F (36.7 °C) 63 18 (!) 146/57 95 % 02/28/22 1521 97.9 °F (36.6 °C) 67 18 (!) 155/57 95 %     Oxygen Therapy  O2 Sat (%): 97 % (03/01/22 1147)  Pulse via Oximetry: 60 beats per minute (02/24/22 1850)  O2 Device: None (Room air) (03/01/22 0792)    Estimated body mass index is 20.19 kg/m² as calculated from the following:    Height as of this encounter: 6' (1.829 m). Weight as of this encounter: 67.5 kg (148 lb 14.4 oz). Intake/Output Summary (Last 24 hours) at 3/1/2022 1442  Last data filed at 3/1/2022 1317  Gross per 24 hour   Intake 2968 ml   Output 2900 ml   Net 68 ml         Physical Exam:  General:          Well nourished.  No overt distress. Appears chronically ill.  nonverbal   Head:               Normocephalic, atraumatic  Eyes:               Sclerae appear normal.  Pupils equally round. ENT:                Nares appear normal, no drainage.  Moist oral mucosa  Neck:               No restricted ROM.  Trachea midline   CV:                  RRR.  No m/r/g.  No jugular venous distension. Lungs:             CTAB.  No wheezing, rhonchi, or rales.  Respirations even, unlabored.  Right clavicular deformity palpated. Abdomen:        Bowel sounds present.  Soft, non-tender (no grimacing with palpation), nondistended. Extremities:     No cyanosis or clubbing.  No edema. Rigid LE with ROM.    Skin:                No rashes and normal coloration.   Warm and dry.    Neuro:             CN II-XII grossly intact.   Sensation intact.  Alert, eyes open, non verbal this morning   Psych:             Normal mood and affect.        Signed:  Cathie Chahal NP

## 2022-03-16 ENCOUNTER — HOME HEALTH ADMISSION (OUTPATIENT)
Dept: HOME HEALTH SERVICES | Facility: HOME HEALTH | Age: 87
End: 2022-03-16
Payer: MEDICARE

## 2022-03-18 PROBLEM — G62.89 AXONAL NEUROPATHY: Status: ACTIVE | Noted: 2020-08-04

## 2022-03-18 PROBLEM — N39.0 UTI (URINARY TRACT INFECTION): Status: ACTIVE | Noted: 2022-02-24

## 2022-03-18 PROBLEM — D64.9 NORMOCYTIC ANEMIA: Status: ACTIVE | Noted: 2022-02-24

## 2022-03-19 ENCOUNTER — HOME CARE VISIT (OUTPATIENT)
Dept: SCHEDULING | Facility: HOME HEALTH | Age: 87
End: 2022-03-19
Payer: MEDICARE

## 2022-03-19 VITALS
HEART RATE: 65 BPM | DIASTOLIC BLOOD PRESSURE: 64 MMHG | TEMPERATURE: 97.3 F | RESPIRATION RATE: 17 BRPM | SYSTOLIC BLOOD PRESSURE: 126 MMHG | OXYGEN SATURATION: 98 %

## 2022-03-19 PROBLEM — D72.829 LEUKOCYTOSIS: Status: ACTIVE | Noted: 2022-02-24

## 2022-03-19 PROBLEM — Z86.79 HISTORY OF ATRIAL FLUTTER: Status: ACTIVE | Noted: 2017-11-03

## 2022-03-19 PROBLEM — G93.41 ACUTE METABOLIC ENCEPHALOPATHY: Status: ACTIVE | Noted: 2022-02-24

## 2022-03-19 PROBLEM — F32.9 REACTIVE DEPRESSION: Status: ACTIVE | Noted: 2020-11-18

## 2022-03-19 PROBLEM — I73.9 PERIPHERAL VASCULAR DISEASE (HCC): Status: ACTIVE | Noted: 2020-06-14

## 2022-03-19 PROBLEM — R26.81 UNSTEADY GAIT: Status: ACTIVE | Noted: 2021-03-16

## 2022-03-19 PROBLEM — I44.0 FIRST DEGREE AV BLOCK: Status: ACTIVE | Noted: 2017-11-03

## 2022-03-19 PROBLEM — Z95.1 S/P CABG (CORONARY ARTERY BYPASS GRAFT): Status: ACTIVE | Noted: 2017-11-03

## 2022-03-19 PROBLEM — E53.8 B12 DEFICIENCY: Status: ACTIVE | Noted: 2020-08-04

## 2022-03-19 PROCEDURE — 400013 HH SOC

## 2022-03-19 PROCEDURE — G0299 HHS/HOSPICE OF RN EA 15 MIN: HCPCS

## 2022-03-20 PROBLEM — F02.80 LATE ONSET ALZHEIMER'S DISEASE WITHOUT BEHAVIORAL DISTURBANCE (HCC): Status: ACTIVE | Noted: 2020-08-04

## 2022-03-20 PROBLEM — G30.1 LATE ONSET ALZHEIMER'S DISEASE WITHOUT BEHAVIORAL DISTURBANCE (HCC): Status: ACTIVE | Noted: 2020-08-04

## 2022-03-21 ENCOUNTER — HOME CARE VISIT (OUTPATIENT)
Dept: SCHEDULING | Facility: HOME HEALTH | Age: 87
End: 2022-03-21
Payer: MEDICARE

## 2022-03-21 VITALS
DIASTOLIC BLOOD PRESSURE: 62 MMHG | SYSTOLIC BLOOD PRESSURE: 112 MMHG | TEMPERATURE: 98.6 F | OXYGEN SATURATION: 99 % | RESPIRATION RATE: 16 BRPM | HEART RATE: 68 BPM

## 2022-03-21 PROCEDURE — G0153 HHCP-SVS OF S/L PATH,EA 15MN: HCPCS

## 2022-03-21 PROCEDURE — G0151 HHCP-SERV OF PT,EA 15 MIN: HCPCS

## 2022-03-22 ENCOUNTER — HOME CARE VISIT (OUTPATIENT)
Dept: SCHEDULING | Facility: HOME HEALTH | Age: 87
End: 2022-03-22
Payer: MEDICARE

## 2022-03-22 VITALS
DIASTOLIC BLOOD PRESSURE: 60 MMHG | SYSTOLIC BLOOD PRESSURE: 124 MMHG | TEMPERATURE: 97.7 F | RESPIRATION RATE: 17 BRPM | OXYGEN SATURATION: 98 % | HEART RATE: 76 BPM

## 2022-03-22 PROCEDURE — G0299 HHS/HOSPICE OF RN EA 15 MIN: HCPCS

## 2022-03-23 ENCOUNTER — HOME CARE VISIT (OUTPATIENT)
Dept: SCHEDULING | Facility: HOME HEALTH | Age: 87
End: 2022-03-23
Payer: MEDICARE

## 2022-03-23 VITALS
TEMPERATURE: 98 F | SYSTOLIC BLOOD PRESSURE: 116 MMHG | OXYGEN SATURATION: 97 % | RESPIRATION RATE: 16 BRPM | HEART RATE: 66 BPM | DIASTOLIC BLOOD PRESSURE: 60 MMHG

## 2022-03-23 VITALS
SYSTOLIC BLOOD PRESSURE: 116 MMHG | DIASTOLIC BLOOD PRESSURE: 74 MMHG | TEMPERATURE: 98.6 F | RESPIRATION RATE: 16 BRPM | OXYGEN SATURATION: 99 % | HEART RATE: 64 BPM

## 2022-03-23 PROCEDURE — G0153 HHCP-SVS OF S/L PATH,EA 15MN: HCPCS

## 2022-03-24 ENCOUNTER — HOME CARE VISIT (OUTPATIENT)
Dept: SCHEDULING | Facility: HOME HEALTH | Age: 87
End: 2022-03-24
Payer: MEDICARE

## 2022-03-24 PROCEDURE — G0152 HHCP-SERV OF OT,EA 15 MIN: HCPCS

## 2022-03-25 ENCOUNTER — HOME CARE VISIT (OUTPATIENT)
Dept: SCHEDULING | Facility: HOME HEALTH | Age: 87
End: 2022-03-25
Payer: MEDICARE

## 2022-03-25 VITALS
TEMPERATURE: 97.1 F | HEART RATE: 64 BPM | SYSTOLIC BLOOD PRESSURE: 142 MMHG | DIASTOLIC BLOOD PRESSURE: 66 MMHG | OXYGEN SATURATION: 98 % | RESPIRATION RATE: 17 BRPM

## 2022-03-25 PROCEDURE — G0299 HHS/HOSPICE OF RN EA 15 MIN: HCPCS

## 2022-03-25 PROCEDURE — A6223 GAUZE >16<=48 NO W/SAL W/O B: HCPCS

## 2022-03-25 PROCEDURE — A6252 ABSORPT DRG >16 <=48 W/O BDR: HCPCS

## 2022-03-25 PROCEDURE — A4450 NON-WATERPROOF TAPE: HCPCS

## 2022-03-25 PROCEDURE — A9270 NON-COVERED ITEM OR SERVICE: HCPCS

## 2022-03-25 PROCEDURE — A6446 CONFORM BAND S W>=3" <5"/YD: HCPCS

## 2022-03-25 PROCEDURE — A6216 NON-STERILE GAUZE<=16 SQ IN: HCPCS

## 2022-03-25 PROCEDURE — A6457 TUBULAR DRESSING: HCPCS

## 2022-03-26 PROBLEM — N39.0 UTI (URINARY TRACT INFECTION): Status: RESOLVED | Noted: 2022-02-24 | Resolved: 2022-03-26

## 2022-03-28 VITALS
RESPIRATION RATE: 16 BRPM | OXYGEN SATURATION: 98 % | HEART RATE: 60 BPM | SYSTOLIC BLOOD PRESSURE: 134 MMHG | TEMPERATURE: 98.9 F | DIASTOLIC BLOOD PRESSURE: 60 MMHG

## 2022-03-29 ENCOUNTER — HOME CARE VISIT (OUTPATIENT)
Dept: SCHEDULING | Facility: HOME HEALTH | Age: 87
End: 2022-03-29
Payer: MEDICARE

## 2022-03-29 VITALS
OXYGEN SATURATION: 97 % | DIASTOLIC BLOOD PRESSURE: 70 MMHG | HEART RATE: 62 BPM | RESPIRATION RATE: 14 BRPM | TEMPERATURE: 97 F | SYSTOLIC BLOOD PRESSURE: 145 MMHG

## 2022-03-29 VITALS
OXYGEN SATURATION: 98 % | TEMPERATURE: 98 F | HEART RATE: 68 BPM | RESPIRATION RATE: 17 BRPM | DIASTOLIC BLOOD PRESSURE: 64 MMHG | SYSTOLIC BLOOD PRESSURE: 140 MMHG

## 2022-03-29 PROCEDURE — G0299 HHS/HOSPICE OF RN EA 15 MIN: HCPCS

## 2022-03-29 PROCEDURE — G0158 HHC OT ASSISTANT EA 15: HCPCS

## 2022-03-30 ENCOUNTER — HOME CARE VISIT (OUTPATIENT)
Dept: SCHEDULING | Facility: HOME HEALTH | Age: 87
End: 2022-03-30
Payer: MEDICARE

## 2022-03-30 VITALS
HEART RATE: 73 BPM | TEMPERATURE: 97.3 F | RESPIRATION RATE: 18 BRPM | SYSTOLIC BLOOD PRESSURE: 120 MMHG | DIASTOLIC BLOOD PRESSURE: 62 MMHG

## 2022-03-30 PROCEDURE — G0157 HHC PT ASSISTANT EA 15: HCPCS

## 2022-03-31 ENCOUNTER — HOME CARE VISIT (OUTPATIENT)
Dept: SCHEDULING | Facility: HOME HEALTH | Age: 87
End: 2022-03-31
Payer: MEDICARE

## 2022-03-31 VITALS
RESPIRATION RATE: 15 BRPM | DIASTOLIC BLOOD PRESSURE: 60 MMHG | SYSTOLIC BLOOD PRESSURE: 118 MMHG | HEART RATE: 62 BPM | TEMPERATURE: 97.8 F | OXYGEN SATURATION: 96 %

## 2022-03-31 PROCEDURE — G0158 HHC OT ASSISTANT EA 15: HCPCS

## 2022-04-01 ENCOUNTER — HOME CARE VISIT (OUTPATIENT)
Dept: SCHEDULING | Facility: HOME HEALTH | Age: 87
End: 2022-04-01
Payer: MEDICARE

## 2022-04-01 VITALS
HEART RATE: 72 BPM | DIASTOLIC BLOOD PRESSURE: 60 MMHG | SYSTOLIC BLOOD PRESSURE: 126 MMHG | TEMPERATURE: 97.3 F | RESPIRATION RATE: 18 BRPM

## 2022-04-01 VITALS
SYSTOLIC BLOOD PRESSURE: 122 MMHG | DIASTOLIC BLOOD PRESSURE: 60 MMHG | RESPIRATION RATE: 16 BRPM | OXYGEN SATURATION: 94 % | HEART RATE: 60 BPM | TEMPERATURE: 98 F

## 2022-04-01 PROCEDURE — G0157 HHC PT ASSISTANT EA 15: HCPCS

## 2022-04-01 PROCEDURE — G0299 HHS/HOSPICE OF RN EA 15 MIN: HCPCS

## 2022-04-04 ENCOUNTER — HOME CARE VISIT (OUTPATIENT)
Dept: SCHEDULING | Facility: HOME HEALTH | Age: 87
End: 2022-04-04
Payer: MEDICARE

## 2022-04-04 VITALS
TEMPERATURE: 97.5 F | DIASTOLIC BLOOD PRESSURE: 62 MMHG | HEART RATE: 65 BPM | SYSTOLIC BLOOD PRESSURE: 140 MMHG | OXYGEN SATURATION: 95 %

## 2022-04-04 PROCEDURE — G0158 HHC OT ASSISTANT EA 15: HCPCS

## 2022-04-05 ENCOUNTER — HOME CARE VISIT (OUTPATIENT)
Dept: SCHEDULING | Facility: HOME HEALTH | Age: 87
End: 2022-04-05
Payer: MEDICARE

## 2022-04-05 VITALS
SYSTOLIC BLOOD PRESSURE: 124 MMHG | TEMPERATURE: 97.4 F | DIASTOLIC BLOOD PRESSURE: 60 MMHG | RESPIRATION RATE: 17 BRPM | HEART RATE: 76 BPM

## 2022-04-05 VITALS
RESPIRATION RATE: 18 BRPM | SYSTOLIC BLOOD PRESSURE: 126 MMHG | TEMPERATURE: 97.3 F | HEART RATE: 75 BPM | DIASTOLIC BLOOD PRESSURE: 64 MMHG

## 2022-04-05 PROCEDURE — G0299 HHS/HOSPICE OF RN EA 15 MIN: HCPCS

## 2022-04-05 PROCEDURE — G0157 HHC PT ASSISTANT EA 15: HCPCS

## 2022-04-07 ENCOUNTER — HOME CARE VISIT (OUTPATIENT)
Dept: SCHEDULING | Facility: HOME HEALTH | Age: 87
End: 2022-04-07
Payer: MEDICARE

## 2022-04-07 VITALS
TEMPERATURE: 98 F | SYSTOLIC BLOOD PRESSURE: 118 MMHG | OXYGEN SATURATION: 98 % | HEART RATE: 82 BPM | RESPIRATION RATE: 18 BRPM | DIASTOLIC BLOOD PRESSURE: 60 MMHG

## 2022-04-07 PROCEDURE — G0151 HHCP-SERV OF PT,EA 15 MIN: HCPCS

## 2022-04-08 ENCOUNTER — HOME CARE VISIT (OUTPATIENT)
Dept: SCHEDULING | Facility: HOME HEALTH | Age: 87
End: 2022-04-08
Payer: MEDICARE

## 2022-04-08 VITALS
DIASTOLIC BLOOD PRESSURE: 60 MMHG | OXYGEN SATURATION: 99 % | TEMPERATURE: 97.2 F | RESPIRATION RATE: 17 BRPM | SYSTOLIC BLOOD PRESSURE: 126 MMHG | HEART RATE: 62 BPM

## 2022-04-08 PROCEDURE — G0299 HHS/HOSPICE OF RN EA 15 MIN: HCPCS

## 2022-04-11 ENCOUNTER — HOME CARE VISIT (OUTPATIENT)
Dept: SCHEDULING | Facility: HOME HEALTH | Age: 87
End: 2022-04-11
Payer: MEDICARE

## 2022-04-11 VITALS
OXYGEN SATURATION: 95 % | SYSTOLIC BLOOD PRESSURE: 130 MMHG | RESPIRATION RATE: 15 BRPM | DIASTOLIC BLOOD PRESSURE: 55 MMHG | HEART RATE: 60 BPM | TEMPERATURE: 98.3 F

## 2022-04-11 PROCEDURE — G0158 HHC OT ASSISTANT EA 15: HCPCS

## 2022-04-14 ENCOUNTER — HOME CARE VISIT (OUTPATIENT)
Dept: SCHEDULING | Facility: HOME HEALTH | Age: 87
End: 2022-04-14
Payer: MEDICARE

## 2022-04-14 ENCOUNTER — TELEPHONE (OUTPATIENT)
Dept: WOUND CARE | Age: 87
End: 2022-04-14

## 2022-04-14 VITALS
OXYGEN SATURATION: 98 % | DIASTOLIC BLOOD PRESSURE: 64 MMHG | SYSTOLIC BLOOD PRESSURE: 124 MMHG | TEMPERATURE: 99.1 F | HEART RATE: 68 BPM | RESPIRATION RATE: 17 BRPM

## 2022-04-14 PROCEDURE — G0299 HHS/HOSPICE OF RN EA 15 MIN: HCPCS

## 2022-04-14 NOTE — TELEPHONE ENCOUNTER
Received call from Kimi with [facility], stating patient has appointment Monday 4/18/22 with wound care, wanting to give us a heads up patient has hypergranulation tissue and will possibly need silver nitrate to allow wound to heal.

## 2022-04-18 ENCOUNTER — HOME CARE VISIT (OUTPATIENT)
Dept: SCHEDULING | Facility: HOME HEALTH | Age: 87
End: 2022-04-18
Payer: MEDICARE

## 2022-04-18 ENCOUNTER — HOSPITAL ENCOUNTER (OUTPATIENT)
Dept: WOUND CARE | Age: 87
Discharge: HOME | End: 2022-04-18
Attending: SURGERY
Payer: MEDICARE

## 2022-04-18 VITALS
DIASTOLIC BLOOD PRESSURE: 58 MMHG | HEART RATE: 68 BPM | SYSTOLIC BLOOD PRESSURE: 112 MMHG | RESPIRATION RATE: 20 BRPM | TEMPERATURE: 98.9 F | OXYGEN SATURATION: 95 %

## 2022-04-18 DIAGNOSIS — S61.402A OPEN WOUND OF LEFT HAND WITHOUT FOREIGN BODY, UNSPECIFIED WOUND TYPE, INITIAL ENCOUNTER: Primary | ICD-10-CM

## 2022-04-18 PROCEDURE — 99213 OFFICE O/P EST LOW 20 MIN: CPT

## 2022-04-18 PROCEDURE — G0152 HHCP-SERV OF OT,EA 15 MIN: HCPCS

## 2022-04-18 PROCEDURE — 99203 OFFICE O/P NEW LOW 30 MIN: CPT | Performed by: SURGERY

## 2022-04-18 PROCEDURE — 17250 CHEM CAUT OF GRANLTJ TISSUE: CPT | Performed by: SURGERY

## 2022-04-18 PROCEDURE — 17250 CHEM CAUT OF GRANLTJ TISSUE: CPT

## 2022-04-18 PROCEDURE — 400013 HH SOC

## 2022-04-18 RX ORDER — MUPIROCIN 20 MG/G
OINTMENT TOPICAL ONCE
Status: CANCELLED | OUTPATIENT
Start: 2022-04-18 | End: 2022-04-18

## 2022-04-18 RX ORDER — TRIAMCINOLONE ACETONIDE 1 MG/G
OINTMENT TOPICAL ONCE
Status: CANCELLED | OUTPATIENT
Start: 2022-04-18 | End: 2022-04-18

## 2022-04-18 RX ORDER — GENTAMICIN SULFATE 1 MG/G
OINTMENT TOPICAL ONCE
OUTPATIENT
Start: 2022-04-18 | End: 2022-04-18

## 2022-04-18 RX ORDER — LIDOCAINE HYDROCHLORIDE 20 MG/ML
5 SOLUTION OROPHARYNGEAL ONCE
OUTPATIENT
Start: 2022-04-18 | End: 2022-04-18

## 2022-04-18 RX ORDER — BACITRACIN 500 [USP'U]/G
OINTMENT TOPICAL ONCE
OUTPATIENT
Start: 2022-04-18 | End: 2022-04-18

## 2022-04-18 RX ORDER — BACITRACIN ZINC AND POLYMYXIN B SULFATE 500; 1000 [USP'U]/G; [USP'U]/G
OINTMENT TOPICAL ONCE
Status: CANCELLED | OUTPATIENT
Start: 2022-04-18 | End: 2022-04-18

## 2022-04-18 RX ORDER — SILVER SULFADIAZINE 10 G/1000G
CREAM TOPICAL ONCE
Status: CANCELLED | OUTPATIENT
Start: 2022-04-18 | End: 2022-04-18

## 2022-04-18 RX ORDER — CLOBETASOL PROPIONATE 0.5 MG/G
OINTMENT TOPICAL ONCE
Status: CANCELLED | OUTPATIENT
Start: 2022-04-18 | End: 2022-04-18

## 2022-04-18 RX ORDER — LIDOCAINE HYDROCHLORIDE 40 MG/ML
SOLUTION TOPICAL ONCE
OUTPATIENT
Start: 2022-04-18 | End: 2022-04-18

## 2022-04-18 RX ORDER — LIDOCAINE HYDROCHLORIDE 20 MG/ML
JELLY TOPICAL ONCE
OUTPATIENT
Start: 2022-04-18 | End: 2022-04-18

## 2022-04-18 RX ORDER — BETAMETHASONE DIPROPIONATE 0.5 MG/G
OINTMENT TOPICAL ONCE
OUTPATIENT
Start: 2022-04-18 | End: 2022-04-18

## 2022-04-18 RX ORDER — LIDOCAINE HYDROCHLORIDE 40 MG/ML
SOLUTION TOPICAL ONCE
Status: CANCELLED | OUTPATIENT
Start: 2022-04-18 | End: 2022-04-18

## 2022-04-18 RX ORDER — MUPIROCIN 20 MG/G
OINTMENT TOPICAL ONCE
OUTPATIENT
Start: 2022-04-18 | End: 2022-04-18

## 2022-04-18 RX ORDER — BACITRACIN 500 [USP'U]/G
OINTMENT TOPICAL ONCE
Status: CANCELLED | OUTPATIENT
Start: 2022-04-18 | End: 2022-04-18

## 2022-04-18 RX ORDER — LIDOCAINE 40 MG/G
CREAM TOPICAL ONCE
OUTPATIENT
Start: 2022-04-18 | End: 2022-04-18

## 2022-04-18 RX ORDER — LIDOCAINE 50 MG/G
OINTMENT TOPICAL ONCE
OUTPATIENT
Start: 2022-04-18 | End: 2022-04-18

## 2022-04-18 RX ORDER — LIDOCAINE 40 MG/G
CREAM TOPICAL ONCE
Status: CANCELLED | OUTPATIENT
Start: 2022-04-18 | End: 2022-04-18

## 2022-04-18 RX ORDER — TRIAMCINOLONE ACETONIDE 1 MG/G
OINTMENT TOPICAL ONCE
OUTPATIENT
Start: 2022-04-18 | End: 2022-04-18

## 2022-04-18 RX ORDER — LIDOCAINE HYDROCHLORIDE 20 MG/ML
JELLY TOPICAL ONCE
Status: CANCELLED | OUTPATIENT
Start: 2022-04-18 | End: 2022-04-18

## 2022-04-18 RX ORDER — LIDOCAINE 50 MG/G
OINTMENT TOPICAL ONCE
Status: CANCELLED | OUTPATIENT
Start: 2022-04-18 | End: 2022-04-18

## 2022-04-18 RX ORDER — BETAMETHASONE DIPROPIONATE 0.5 MG/G
OINTMENT TOPICAL ONCE
Status: CANCELLED | OUTPATIENT
Start: 2022-04-18 | End: 2022-04-18

## 2022-04-18 RX ORDER — SILVER NITRATE 38.21; 12.74 MG/1; MG/1
1 STICK TOPICAL ONCE
Status: COMPLETED | OUTPATIENT
Start: 2022-04-18 | End: 2022-04-18

## 2022-04-18 RX ORDER — SILVER SULFADIAZINE 10 G/1000G
CREAM TOPICAL ONCE
OUTPATIENT
Start: 2022-04-18 | End: 2022-04-18

## 2022-04-18 RX ORDER — GENTAMICIN SULFATE 1 MG/G
OINTMENT TOPICAL ONCE
Status: CANCELLED | OUTPATIENT
Start: 2022-04-18 | End: 2022-04-18

## 2022-04-18 RX ORDER — CLOBETASOL PROPIONATE 0.5 MG/G
OINTMENT TOPICAL ONCE
OUTPATIENT
Start: 2022-04-18 | End: 2022-04-18

## 2022-04-18 RX ORDER — BACITRACIN ZINC AND POLYMYXIN B SULFATE 500; 1000 [USP'U]/G; [USP'U]/G
OINTMENT TOPICAL ONCE
OUTPATIENT
Start: 2022-04-18 | End: 2022-04-18

## 2022-04-18 RX ORDER — LIDOCAINE HYDROCHLORIDE 20 MG/ML
5 SOLUTION OROPHARYNGEAL ONCE
Status: CANCELLED | OUTPATIENT
Start: 2022-04-18 | End: 2022-04-18

## 2022-04-18 RX ADMIN — SILVER NITRATE 1 APPLICATOR: 38.21; 12.74 STICK TOPICAL at 14:50

## 2022-04-18 NOTE — PROGRESS NOTES
[address], [address] [facility] [address], [address]  [phone]    Progress Notes   Duey Nissen       MRN: [id number]     : 6/10/1932     Age: 80 y.o. Subjective/HPI:   This patient is a 80 y.o. male with Alzheimer's disease seen and evaluated at the wound clinic for initial evaluation of traumatic wound of the left hand. Injury occurred about 1 month ago and has been treated with Xeroform gauze and Tubigrip. According to caregivers it does not seem to be causing him any discomfort. There has not been any erythema or purulent drainage. Review of Systems  A comprehensive review of systems was negative except for that written in the HPI. Past Medical History:   Diagnosis Date    Axonal neuropathy 2020    Late onset Alzheimer's disease without behavioral disturbance ([facility] [address] 2020    Reactive depression 2020    Unsteady gait 3/16/2021      Past Surgical History:   Procedure Laterality Date    HX PACEMAKER        Allergies   Allergen Reactions    Penicillins Hives      Social History     Tobacco Use    Smoking status: Former Smoker     Quit date: 11/3/1964     Years since quittin.4    Smokeless tobacco: Never Used   Substance Use Topics    Alcohol use: Not on file      Social History     Social History Narrative    Not on file     History reviewed. No pertinent family history. Cannot display prior to admission medications because the patient has not been admitted in this contact. Current Outpatient Medications   Medication Sig    melatonin 5 mg tablet Take 10 mg by mouth nightly.  multivitamin (ONE A DAY) tablet Take 1 Tablet by mouth daily.  ascorbic acid, vitamin C, (Vitamin C) 500 mg tablet Take 500 mg by mouth daily.     sertraline (ZOLOFT) 50 mg tablet TAKE 1/2 A TABLET BY MOUTH AT BEDTIME FOR 2 WEEKS THEN INCREASE TO 1 TABLET AT BEDTIME (Patient taking differently: Take 50 mg by mouth nightly.)    donepeziL (ARICEPT) 10 mg tablet Take 1 Tablet by mouth daily. (Patient taking differently: Take 1 Tablet by mouth nightly.)    memantine (NAMENDA) 10 mg tablet Take 1 Tablet by mouth two (2) times a day. (Patient taking differently: Take 15 mg by mouth nightly.)    cyanocobalamin (VITAMIN B-12) 1,000 mcg sublingual tablet 1 tablet daily under the tongue dissolve (Patient taking differently: Take 1,000 mcg by mouth Every morning. 1 tablet daily under the tongue dissolve)    apixaban (ELIQUIS) 5 mg tablet Take 5 mg by mouth two (2) times a day.  cholecalciferol (VITAMIN D3) (1000 Units /25 mcg) tablet Take 1,000 Units by mouth daily.  aspirin delayed-release 81 mg tablet Take 81 mg by mouth daily.  simvastatin (ZOCOR) 40 mg tablet Take 20 mg by mouth nightly.  isosorbide mononitrate ER (IMDUR) 60 mg CR tablet Take 30 mg by mouth nightly.  nitroglycerin (NITROSTAT) 0.4 mg SL tablet 0.4 mg by SubLINGual route as needed for Chest Pain. Place 1 tab sublingual under the tongue every 5 min as needed for chest pain. Max 3 doses in 15 min time period. Call 911 if no relief after the third dose       No current facility-administered medications for this encounter. Objective:     Vitals:    04/18/22 1431   BP: (!) 112/58   Pulse: 68   Resp: 20   Temp: 98.9 °F (37.2 °C)   SpO2: 95%       Physical Exam:  Ambulation: Wheelchair  Gen- the patient is well developed and in no acute distress  HEENT- no focal abnormalities of the scalp or face. Neck- no JVD or retractions  Lungs- normal respiratory effort. Cardiovascular:  Lower limb pulses: Not evaluated. Abd- soft and no masses evident. Ext- warm without cyanosis. Skin- no jaundice or rashes. Open wound dorsum left hand with hypertrophic granulation tissue. Please see the specific measurements and descriptions of the wound(s) below. There is no evidence of periwound induration or warmth. No purulence or odor.    Neuro- alert, does not respond verbally, tremor left hand  Psychological: Does not respond verbally     Wound Hand Dorsal (Active)   Wound Image   04/18/22 1440   Wound Etiology Skin Tear 04/18/22 1440   Dressing Status Clean;Dry; Intact 04/18/22 1440   Cleansed Cleansed with saline 04/18/22 1440   Dressing/Treatment Xeroform 04/18/22 1440   Wound Length (cm) 4.5 cm 04/18/22 1440   Wound Width (cm) 1.5 cm 04/18/22 1440   Wound Depth (cm) 0.1 cm 04/18/22 1440   Wound Surface Area (cm^2) 6.75 cm^2 04/18/22 1440   Change in Wound Size % 45.12 04/18/22 1440   Wound Volume (cm^3) 0.675 cm^3 04/18/22 1440   Wound Healing % 45 04/18/22 1440   Wound Assessment Hyper granulation tissue 04/18/22 1440   Drainage Amount Small 04/18/22 1440   Drainage Description Serosanguinous 04/18/22 1440   Wound Odor None 04/18/22 1440   Oanh-Wound/Incision Assessment Fragile 04/18/22 1440   Wound Thickness Description Full thickness 04/18/22 1440   Number of days: 30       [REMOVED] Wound Elbow Right skin tear (Removed)   Number of days: 19        Assessment:   Traumatic wound dorsum left hand with hypertrophic granulation tissue  Patient Active Problem List   Diagnosis Code    History of atrial flutter Z86.79    First degree AV block I44.0    S/P CABG (coronary artery bypass graft) Z95.1    Peripheral vascular disease (HCC) I73.9    Late onset Alzheimer's disease without behavioral disturbance ([facility]) G30.1, F02.80    Axonal neuropathy G62.89    B12 deficiency E53.8    Reactive depression F32.9    Unsteady gait R26.81    Acute metabolic encephalopathy G92.67    UTI (urinary tract infection) N39.0    Leukocytosis D72.829    Normocytic anemia D64.9     Plan:   Silver nitrate was applied to the hypertrophic granulation tissue. Plan to continue dressing changes with Xeroform and Tubigrip, follow-up in 2 weeks for recheck.     Follow-up Information     Follow up With Specialties Details Why Contact Info    [facility]  [address]  For wound re-check [address] [facility]  [phone]            Thank you very much for the opportunity to participate in this patient's care. Signed By: Urbano Gatica MD     April 18, 2022        TIME:  If total time for today's E/M service is used for level of service it is documented below. This time includes physician non-face-to-face service time visit on the date of service and includes    Preparing to see the patient (eg, review of tests)  Obtaining and/or reviewing separately obtained history  Performing a medically necessary appropriate examination and/or evaluation  Counseling and educating the patient/family/caregiver  Ordering medications, tests, or procedures  Referring and communicating with other health care professionals as needed  Documenting clinical information in the electronic or other health record  Independently interpreting results (not reported separately) and communicating results to the patient/family/caregiver  Care coordination (not reported separately)    E/M time =      20    minutes.

## 2022-04-18 NOTE — WOUND CARE
04/18/22 1440   Wound Hand Dorsal   Date First Assessed: 03/19/22   Primary Wound Type: Skin Tear  Location: Hand  Wound Location Orientation: Dorsal   Wound Image    Wound Etiology Skin Tear   Dressing Status Clean;Dry; Intact   Cleansed Cleansed with saline   Dressing/Treatment Xeroform   Wound Length (cm) 4.5 cm   Wound Width (cm) 1.5 cm   Wound Depth (cm) 0.1 cm   Wound Surface Area (cm^2) 6.75 cm^2   Change in Wound Size % 45.12   Wound Volume (cm^3) 0.675 cm^3   Wound Healing % 45   Wound Assessment Hyper granulation tissue   Drainage Amount Small   Drainage Description Serosanguinous   Wound Odor None   Oanh-Wound/Incision Assessment Fragile   Wound Thickness Description Full thickness   Takes ASA 81mg Daily and Eliquis Daily

## 2022-04-18 NOTE — WOUND CARE
[address]  [address], [address]  Phone: [phone]  Fax: [phone]    Patient: Yois Phillips MRN: [id number]  SSN: [id number]    YOB: 1932  Age: 80 y.o. Sex: male       Return Appointment: 2 weeks with Dr Mainor Pulliam    Instructions: Left Hand:  Cleanse with Normal Saline  Apply Xeroform- apply to wound bed. Cover with ABD/Gauze  Wrap with Rolled Gauze  Change Daily  May Cover with Sock, Tubigrip    Silver Nitrate Applied to Hypergranulation at [address] [facility] Visit    Should you experience increased redness, swelling, pain, foul odor, size of wound(s), or have a temperature over 101 degrees please contact the [address] [place] [address] Road at [phone] or if after hours contact your primary care physician or go to the hospital emergency department.     Signed By: Deepika Aldridge RN     April 18, 2022

## 2022-04-18 NOTE — DISCHARGE INSTRUCTIONS
[address]  [address], [address]   Phone: [phone]  Fax: [phone]    Patient: Dennison Krabbe MRN: [id number]  SSN: [id number]    YOB: 1932  Age: 80 y.o. Sex: male       Return Appointment: 2 weeks with Dr Carrie Calvo    Instructions: Left Hand:  Cleanse with Normal Saline  Apply Xeroform- apply to wound bed. Cover with ABD/Gauze  Wrap with Rolled Gauze  Change Daily  May Cover with Sock, Tubigrip    Silver Nitrate Applied to Hypergranulation at [address] [facility] Visit    Should you experience increased redness, swelling, pain, foul odor, size of wound(s), or have a temperature over 101 degrees please contact the [address] [place] [address] Road at [phone] or if after hours contact your primary care physician or go to the hospital emergency department.     Signed By: Dirk Nissen, RN     April 18, 2022

## 2022-04-19 ENCOUNTER — HOME CARE VISIT (OUTPATIENT)
Dept: SCHEDULING | Facility: HOME HEALTH | Age: 87
End: 2022-04-19
Payer: MEDICARE

## 2022-04-19 VITALS
HEART RATE: 60 BPM | TEMPERATURE: 97.6 F | SYSTOLIC BLOOD PRESSURE: 130 MMHG | OXYGEN SATURATION: 98 % | RESPIRATION RATE: 17 BRPM | DIASTOLIC BLOOD PRESSURE: 68 MMHG

## 2022-04-19 VITALS
OXYGEN SATURATION: 98 % | DIASTOLIC BLOOD PRESSURE: 64 MMHG | HEART RATE: 65 BPM | SYSTOLIC BLOOD PRESSURE: 152 MMHG | RESPIRATION RATE: 16 BRPM | TEMPERATURE: 97.8 F

## 2022-04-19 PROCEDURE — G0299 HHS/HOSPICE OF RN EA 15 MIN: HCPCS

## 2022-04-27 ENCOUNTER — HOME CARE VISIT (OUTPATIENT)
Dept: SCHEDULING | Facility: HOME HEALTH | Age: 87
End: 2022-04-27
Payer: MEDICARE

## 2022-04-27 VITALS
HEART RATE: 60 BPM | DIASTOLIC BLOOD PRESSURE: 78 MMHG | OXYGEN SATURATION: 99 % | SYSTOLIC BLOOD PRESSURE: 130 MMHG | RESPIRATION RATE: 17 BRPM | TEMPERATURE: 98.1 F

## 2022-04-27 PROCEDURE — G0299 HHS/HOSPICE OF RN EA 15 MIN: HCPCS

## 2022-05-02 ENCOUNTER — HOSPITAL ENCOUNTER (OUTPATIENT)
Dept: WOUND CARE | Age: 87
Discharge: HOME | End: 2022-05-02
Attending: SURGERY
Payer: MEDICARE

## 2022-05-02 VITALS
HEART RATE: 63 BPM | DIASTOLIC BLOOD PRESSURE: 58 MMHG | HEIGHT: 72 IN | WEIGHT: 141 LBS | BODY MASS INDEX: 19.1 KG/M2 | RESPIRATION RATE: 18 BRPM | SYSTOLIC BLOOD PRESSURE: 103 MMHG | TEMPERATURE: 99.1 F

## 2022-05-02 DIAGNOSIS — S61.402A OPEN WOUND OF LEFT HAND WITHOUT FOREIGN BODY, UNSPECIFIED WOUND TYPE, INITIAL ENCOUNTER: Primary | ICD-10-CM

## 2022-05-02 PROCEDURE — 17250 CHEM CAUT OF GRANLTJ TISSUE: CPT | Performed by: SURGERY

## 2022-05-02 PROCEDURE — 17250 CHEM CAUT OF GRANLTJ TISSUE: CPT

## 2022-05-02 RX ORDER — LIDOCAINE HYDROCHLORIDE 40 MG/ML
SOLUTION TOPICAL ONCE
OUTPATIENT
Start: 2022-05-02 | End: 2022-05-02

## 2022-05-02 RX ORDER — SILVER SULFADIAZINE 10 G/1000G
CREAM TOPICAL ONCE
OUTPATIENT
Start: 2022-05-02 | End: 2022-05-02

## 2022-05-02 RX ORDER — LIDOCAINE HYDROCHLORIDE 20 MG/ML
JELLY TOPICAL ONCE
OUTPATIENT
Start: 2022-05-02 | End: 2022-05-02

## 2022-05-02 RX ORDER — GENTAMICIN SULFATE 1 MG/G
OINTMENT TOPICAL ONCE
OUTPATIENT
Start: 2022-05-02 | End: 2022-05-02

## 2022-05-02 RX ORDER — BACITRACIN ZINC AND POLYMYXIN B SULFATE 500; 1000 [USP'U]/G; [USP'U]/G
OINTMENT TOPICAL ONCE
OUTPATIENT
Start: 2022-05-02 | End: 2022-05-02

## 2022-05-02 RX ORDER — LIDOCAINE HYDROCHLORIDE 20 MG/ML
5 SOLUTION OROPHARYNGEAL ONCE
OUTPATIENT
Start: 2022-05-02 | End: 2022-05-02

## 2022-05-02 RX ORDER — LIDOCAINE 50 MG/G
OINTMENT TOPICAL ONCE
OUTPATIENT
Start: 2022-05-02 | End: 2022-05-02

## 2022-05-02 RX ORDER — CLOBETASOL PROPIONATE 0.5 MG/G
OINTMENT TOPICAL ONCE
OUTPATIENT
Start: 2022-05-02 | End: 2022-05-02

## 2022-05-02 RX ORDER — TRIAMCINOLONE ACETONIDE 1 MG/G
OINTMENT TOPICAL ONCE
OUTPATIENT
Start: 2022-05-02 | End: 2022-05-02

## 2022-05-02 RX ORDER — BACITRACIN 500 [USP'U]/G
OINTMENT TOPICAL ONCE
OUTPATIENT
Start: 2022-05-02 | End: 2022-05-02

## 2022-05-02 RX ORDER — SILVER NITRATE 38.21; 12.74 MG/1; MG/1
1 STICK TOPICAL ONCE
Status: COMPLETED | OUTPATIENT
Start: 2022-05-02 | End: 2022-05-02

## 2022-05-02 RX ORDER — MUPIROCIN 20 MG/G
OINTMENT TOPICAL ONCE
OUTPATIENT
Start: 2022-05-02 | End: 2022-05-02

## 2022-05-02 RX ORDER — LIDOCAINE 40 MG/G
CREAM TOPICAL ONCE
OUTPATIENT
Start: 2022-05-02 | End: 2022-05-02

## 2022-05-02 RX ORDER — BETAMETHASONE DIPROPIONATE 0.5 MG/G
OINTMENT TOPICAL ONCE
OUTPATIENT
Start: 2022-05-02 | End: 2022-05-02

## 2022-05-02 RX ADMIN — SILVER NITRATE 1 APPLICATOR: 38.21; 12.74 STICK TOPICAL at 14:21

## 2022-05-02 NOTE — WOUND CARE
[address]  [address], [address] [facility]  Phone: [phone]  Fax: [phone]    Patient: Greg Thomson MRN: [id number]  SSN: [id number]    YOB: 1932  Age: 80 y.o. Sex: male       Return Appointment: 2 weeks with Dr. Mervin Moore    Instructions: Left Hand:  Cleanse with Normal Saline  Apply adaptic to wound bed. Cover with ABD/Gauze  Wrap with Rolled Gauze  Change Daily  May Cover with Sock, Tubigrip     Silver Nitrate Applied to Hypergranulation at [facility] for dressing change and assessment weekly. Should you experience increased redness, swelling, pain, foul odor, size of wound(s), or have a temperature over 101 degrees please contact the [address] [place] [address] Road at [phone] or if after hours contact your primary care physician or go to the hospital emergency department.     Signed By: Kwasi Bess RN     May 2, 2022

## 2022-05-02 NOTE — PROGRESS NOTES
[address], [address], [address] [place] [address] Rd  [phone]    Progress Notes   Marline Hopper       MRN: [id number]     : 6/10/1932     Age: 80 y.o. Subjective/HPI:   This patient is a 80 y.o. male Alzheimer's disease seen and evaluated at the wound clinic for follow-up of traumatic wound of the left hand. Patient is receiving dressing changes with Xeroform gauze and Tubigrip. No reports of any pain or discomfort nor is there been any drainage from the area. No changes in overall health since previous visit. Review of Systems  A comprehensive review of systems was negative except for that written in the HPI. Past Medical History:   Diagnosis Date    Axonal neuropathy 2020    Late onset Alzheimer's disease without behavioral disturbance ([facility] [address] 2020    Reactive depression 2020    Unsteady gait 3/16/2021      Past Surgical History:   Procedure Laterality Date    HX PACEMAKER        Allergies   Allergen Reactions    Penicillins Hives      Social History     Tobacco Use    Smoking status: Former Smoker     Quit date: 11/3/1964     Years since quittin.5    Smokeless tobacco: Never Used   Substance Use Topics    Alcohol use: Not on file      Social History     Social History Narrative    Not on file     History reviewed. No pertinent family history. Cannot display prior to admission medications because the patient has not been admitted in this contact. Current Outpatient Medications   Medication Sig    trimethoprim-sulfamethoxazole (BACTRIM DS, SEPTRA DS) 160-800 mg per tablet Take 1 Tablet by mouth two (2) times a day for 21 days.  melatonin 5 mg tablet Take 10 mg by mouth nightly.  multivitamin (ONE A DAY) tablet Take 1 Tablet by mouth daily.  ascorbic acid, vitamin C, (Vitamin C) 500 mg tablet Take 500 mg by mouth daily.     sertraline (ZOLOFT) 50 mg tablet TAKE 1/2 A TABLET BY MOUTH AT BEDTIME FOR 2 WEEKS THEN INCREASE TO 1 TABLET AT BEDTIME (Patient taking differently: Take 50 mg by mouth nightly.)    donepeziL (ARICEPT) 10 mg tablet Take 1 Tablet by mouth daily. (Patient taking differently: Take 1 Tablet by mouth nightly.)    memantine (NAMENDA) 10 mg tablet Take 1 Tablet by mouth two (2) times a day. (Patient taking differently: Take 15 mg by mouth nightly.)    cyanocobalamin (VITAMIN B-12) 1,000 mcg sublingual tablet 1 tablet daily under the tongue dissolve (Patient taking differently: Take 1,000 mcg by mouth Every morning. 1 tablet daily under the tongue dissolve)    apixaban (ELIQUIS) 5 mg tablet Take 5 mg by mouth two (2) times a day.  cholecalciferol (VITAMIN D3) (1000 Units /25 mcg) tablet Take 1,000 Units by mouth daily.  aspirin delayed-release 81 mg tablet Take 81 mg by mouth daily.  simvastatin (ZOCOR) 40 mg tablet Take 20 mg by mouth nightly.  isosorbide mononitrate ER (IMDUR) 60 mg CR tablet Take 30 mg by mouth nightly.  nitroglycerin (NITROSTAT) 0.4 mg SL tablet 0.4 mg by SubLINGual route as needed for Chest Pain. Place 1 tab sublingual under the tongue every 5 min as needed for chest pain. Max 3 doses in 15 min time period. Call 911 if no relief after the third dose       No current facility-administered medications for this encounter. Objective:     Vitals:    05/02/22 1411   BP: (!) 103/58   Pulse: 63   Resp: 18   Temp: 99.1 °F (37.3 °C)   Weight: 64 kg (141 lb)   Height: 6' (1.829 m)       Physical Exam:  Ambulation: Wheelchair  Gen- the patient is well developed and in no acute distress  HEENT- no focal abnormalities of the scalp or face. Neck- no JVD or retractions  Lungs- normal respiratory effort. Cardiovascular:  Lower limb pulses: Not evaluated. Abd- soft and no masses evident. Ext- warm without cyanosis. Skin- no jaundice or rashes. Wound dorsum left hand measures smaller, hypergranulation tissue, no sign of infection.   Please see the specific measurements and descriptions of the wound(s) below. There is no evidence of periwound induration or warmth. No purulence or odor. Neuro- alert, resting tremor. Psychological: Not responsive verbally     Wound Hand Dorsal (Active)   Wound Image   05/02/22 1412   Wound Etiology Skin Tear 05/02/22 1412   Dressing Status Clean;Dry 05/02/22 1412   Cleansed Cleansed with saline 05/02/22 1412   Dressing/Treatment Xeroform 05/02/22 1412   Wound Length (cm) 3.2 cm 05/02/22 1412   Wound Width (cm) 1.4 cm 05/02/22 1412   Wound Depth (cm) 0.1 cm 05/02/22 1412   Wound Surface Area (cm^2) 4.48 cm^2 05/02/22 1412   Change in Wound Size % 63.58 05/02/22 1412   Wound Volume (cm^3) 0.448 cm^3 05/02/22 1412   Wound Healing % 64 05/02/22 1412   Wound Assessment Hyper granulation tissue 05/02/22 1412   Drainage Amount Small 05/02/22 1412   Drainage Description Sanguineous 05/02/22 1412   Wound Odor None 05/02/22 1412   Oanh-Wound/Incision Assessment Fragile 05/02/22 1412   Wound Thickness Description Full thickness 05/02/22 1412   Number of days: 40       [REMOVED] Wound Elbow Right skin tear (Removed)   Number of days: 19        Assessment:   Traumatic wound dorsum left hand with hypertrophic granulation tissue  Patient Active Problem List   Diagnosis Code    History of atrial flutter Z86.79    First degree AV block I44.0    S/P CABG (coronary artery bypass graft) Z95.1    Peripheral vascular disease (HCC) I73.9    Late onset Alzheimer's disease without behavioral disturbance ([facility]) G30.1, F02.80    Axonal neuropathy G62.89    B12 deficiency E53.8    Reactive depression F32.9    Unsteady gait R26.81    Acute metabolic encephalopathy O55.96    UTI (urinary tract infection) N39.0    Leukocytosis D72.829    Normocytic anemia D64.9    Open wound of left hand S61.402A     Plan:   Plan to apply silver nitrate, continue dressing changes with Xeroform gauze and Tubigrip, follow-up in 2 weeks for recheck.     Follow-up Information    None Thank you very much for the opportunity to participate in this patient's care. Signed By: Charles Covarrubias MD     May 2, 2022        TIME:  If total time for today's E/M service is used for level of service it is documented below. This time includes physician non-face-to-face service time visit on the date of service and includes    Preparing to see the patient (eg, review of tests)  Obtaining and/or reviewing separately obtained history  Performing a medically necessary appropriate examination and/or evaluation  Counseling and educating the patient/family/caregiver  Ordering medications, tests, or procedures  Referring and communicating with other health care professionals as needed  Documenting clinical information in the electronic or other health record  Independently interpreting results (not reported separately) and communicating results to the patient/family/caregiver  Care coordination (not reported separately)    E/M time =      20    minutes.

## 2022-05-02 NOTE — PROGRESS NOTES
Chemical Cautery    Performed by: Coreen Douglas MD    Consent obtained?  Yes     Time out taken: Yes    Tissue: Hypergranulation    Method: Silver Nitrate    Location of Chemical Cautery:     Wound/Ulcer Number: 1     Wound Hand Dorsal (Active)   Wound Image   05/02/22 1412   Wound Etiology Skin Tear 05/02/22 1412   Dressing Status Clean;Dry 05/02/22 1412   Cleansed Cleansed with saline 05/02/22 1412   Dressing/Treatment Xeroform 05/02/22 1412   Wound Length (cm) 3.2 cm 05/02/22 1412   Wound Width (cm) 1.4 cm 05/02/22 1412   Wound Depth (cm) 0.1 cm 05/02/22 1412   Wound Surface Area (cm^2) 4.48 cm^2 05/02/22 1412   Change in Wound Size % 63.58 05/02/22 1412   Wound Volume (cm^3) 0.448 cm^3 05/02/22 1412   Wound Healing % 64 05/02/22 1412   Wound Assessment Hyper granulation tissue 05/02/22 1412   Drainage Amount Small 05/02/22 1412   Drainage Description Sanguineous 05/02/22 1412   Wound Odor None 05/02/22 1412   Oanh-Wound/Incision Assessment Fragile 05/02/22 1412   Wound Thickness Description Full thickness 05/02/22 1412   Number of days: 44        Procedural Pain: 0 / 10     Post Procedural Pain: 0 / 10     Response to treatment: Well tolerated by patient

## 2022-05-02 NOTE — WOUND CARE
05/02/22 1412   Wound Hand Dorsal   Date First Assessed: 03/19/22   Primary Wound Type: Skin Tear  Location: Hand  Wound Location Orientation: Dorsal   Wound Image    Wound Etiology Skin Tear   Dressing Status Clean;Dry   Cleansed Cleansed with saline   Dressing/Treatment Xeroform   Wound Length (cm) 3.2 cm   Wound Width (cm) 1.4 cm   Wound Depth (cm) 0.1 cm   Wound Surface Area (cm^2) 4.48 cm^2   Change in Wound Size % 63.58   Wound Volume (cm^3) 0.448 cm^3   Wound Healing % 64   Wound Assessment Hyper granulation tissue   Drainage Amount Small   Drainage Description Sanguineous   Wound Odor None   Oanh-Wound/Incision Assessment Fragile   Wound Thickness Description Full thickness   patient is taking eliquis

## 2022-05-03 ENCOUNTER — HOME CARE VISIT (OUTPATIENT)
Dept: SCHEDULING | Facility: HOME HEALTH | Age: 87
End: 2022-05-03
Payer: MEDICARE

## 2022-05-03 VITALS
OXYGEN SATURATION: 98 % | HEART RATE: 66 BPM | TEMPERATURE: 98.4 F | RESPIRATION RATE: 17 BRPM | SYSTOLIC BLOOD PRESSURE: 136 MMHG | DIASTOLIC BLOOD PRESSURE: 80 MMHG

## 2022-05-03 PROCEDURE — G0299 HHS/HOSPICE OF RN EA 15 MIN: HCPCS

## 2022-05-13 ENCOUNTER — HOME CARE VISIT (OUTPATIENT)
Dept: SCHEDULING | Facility: HOME HEALTH | Age: 87
End: 2022-05-13
Payer: MEDICARE

## 2022-05-17 ENCOUNTER — HOME CARE VISIT (OUTPATIENT)
Dept: SCHEDULING | Facility: HOME HEALTH | Age: 87
End: 2022-05-17
Payer: MEDICARE

## 2022-05-17 VITALS
DIASTOLIC BLOOD PRESSURE: 82 MMHG | SYSTOLIC BLOOD PRESSURE: 142 MMHG | HEART RATE: 60 BPM | RESPIRATION RATE: 19 BRPM | TEMPERATURE: 98.9 F | OXYGEN SATURATION: 95 %

## 2022-05-17 PROCEDURE — G0299 HHS/HOSPICE OF RN EA 15 MIN: HCPCS

## 2022-05-24 ENCOUNTER — HOSPITAL ENCOUNTER (OUTPATIENT)
Dept: WOUND CARE | Age: 87
Setting detail: RECURRING SERIES
Discharge: HOME | End: 2022-05-24
Payer: MEDICARE

## 2022-05-24 VITALS
SYSTOLIC BLOOD PRESSURE: 130 MMHG | HEIGHT: 72 IN | HEART RATE: 66 BPM | BODY MASS INDEX: 19.1 KG/M2 | RESPIRATION RATE: 18 BRPM | TEMPERATURE: 99 F | WEIGHT: 141 LBS | DIASTOLIC BLOOD PRESSURE: 65 MMHG

## 2022-05-24 PROCEDURE — 99212 OFFICE O/P EST SF 10 MIN: CPT | Performed by: SURGERY

## 2022-05-24 PROCEDURE — 99214 OFFICE O/P EST MOD 30 MIN: CPT

## 2022-05-24 RX ORDER — BETAMETHASONE DIPROPIONATE 0.05 %
OINTMENT (GRAM) TOPICAL ONCE
OUTPATIENT
Start: 2022-05-24 | End: 2022-05-24

## 2022-05-24 RX ORDER — CLOBETASOL PROPIONATE 0.5 MG/G
OINTMENT TOPICAL ONCE
OUTPATIENT
Start: 2022-05-24 | End: 2022-05-24

## 2022-05-24 RX ORDER — LIDOCAINE 40 MG/G
CREAM TOPICAL ONCE
OUTPATIENT
Start: 2022-05-24 | End: 2022-05-24

## 2022-05-24 RX ORDER — GENTAMICIN SULFATE 1 MG/G
OINTMENT TOPICAL ONCE
OUTPATIENT
Start: 2022-05-24 | End: 2022-05-24

## 2022-05-24 RX ORDER — LIDOCAINE HYDROCHLORIDE 40 MG/ML
SOLUTION TOPICAL ONCE
OUTPATIENT
Start: 2022-05-24 | End: 2022-05-24

## 2022-05-24 RX ORDER — LIDOCAINE HYDROCHLORIDE 20 MG/ML
JELLY TOPICAL ONCE
OUTPATIENT
Start: 2022-05-24 | End: 2022-05-24

## 2022-05-24 RX ORDER — LIDOCAINE 50 MG/G
OINTMENT TOPICAL ONCE
OUTPATIENT
Start: 2022-05-24 | End: 2022-05-24

## 2022-05-24 RX ORDER — BACITRACIN, NEOMYCIN, POLYMYXIN B 400; 3.5; 5 [USP'U]/G; MG/G; [USP'U]/G
OINTMENT TOPICAL ONCE
OUTPATIENT
Start: 2022-05-24 | End: 2022-05-24

## 2022-05-24 RX ORDER — BACITRACIN ZINC AND POLYMYXIN B SULFATE 500; 1000 [USP'U]/G; [USP'U]/G
OINTMENT TOPICAL ONCE
OUTPATIENT
Start: 2022-05-24 | End: 2022-05-24

## 2022-05-24 RX ORDER — GINSENG 100 MG
CAPSULE ORAL ONCE
OUTPATIENT
Start: 2022-05-24 | End: 2022-05-24

## 2022-05-24 NOTE — WOUND CARE
Discharge Instructions for  Lexis Luu  [address] [facility] [address]  [address] [facility], [address]  Phone [phone]   Fax [phone]      NAME:  Phi Palacios  YOB: 1932  MEDICAL RECORD NUMBER:  [id number]  DATE:  5/24/2022    Return Appointment:   Discharge with Dr. Amy Scott MD      Instructions: Wound is healed! Continue wearing tubigrip sleeve to left hand until new skin becomes stronger. No dressing needed. Electronically signed Sven Vazquez RN on 5/24/2022 at 4:11 PM     Should you experience increased redness, swelling, pain, foul odor, size of wound(s), or have a temperature over 101 degrees please contact the [address] [place] [address] Road at [phone] or if after hours contact your primary care physician or go to the hospital emergency department. PLEASE NOTE: IF YOU ARE UNABLE TO OBTAIN WOUND SUPPLIES, CONTINUE TO USE THE SUPPLIES YOU HAVE AVAILABLE UNTIL YOU ARE ABLE TO REACH US. IT IS MOST IMPORTANT TO KEEP THE WOUND COVERED AT ALL TIMES.

## 2022-05-24 NOTE — FLOWSHEET NOTE
05/24/22 1608   Wound 03/19/22 Hand Left;Dorsal #1   Date First Assessed/Time First Assessed: 03/19/22 1435   Present on Hospital Admission: Yes  Primary Wound Type: Skin Tear  Location: Hand  Wound Location Orientation: Left;Dorsal  Wound Description (Comments): #1   Wound Image    Wound Etiology Skin Tear   Dressing Status Intact   Wound Cleansed Cleansed with saline   Dressing/Treatment Xeroform   Wound Length (cm) 0 cm   Wound Width (cm) 0 cm   Wound Depth (cm) 0 cm   Wound Surface Area (cm^2) 0 cm^2   Wound Volume (cm^3) 0 cm^3   Wound Assessment Epithelialization   Drainage Amount None   Odor None   Louann-wound Assessment Intact   Leo Scale   Sensory Perceptions 2   Moisture 3   Activity 2   Mobility 2   Nutrition 2   Friction and Shear 2   Leo Scale Score 13

## 2022-05-24 NOTE — PROGRESS NOTES
[address], [address], [address]  [phone]    Progress Notes   Henrique Spicer       MRN: [id number]     : 6/10/1932     Age: 80 y.o. Subjective/HPI:   This patient is a 80 y.o. male with Alzheimer's disease seen and evaluated at the wound clinic for follow-up of traumatic wound of the dorsum of the left hand. Patient has been receiving dressing changes with Xeroform gauze and Tubigrip. No reports of pain or discomfort nor has there been any drainage from the area. No reported changes in overall health since previous visit. Review of Systems  Pertinent items are noted in HPI. Past Medical History:   Diagnosis Date    Axonal neuropathy 2020    Late onset Alzheimer's disease without behavioral disturbance ([facility] [address] 2020    Reactive depression 2020    Unsteady gait 3/16/2021      Past Surgical History:   Procedure Laterality Date    PACEMAKER        Allergies   Allergen Reactions    Penicillins Hives      Social History     Tobacco Use    Smoking status: Former Smoker     Quit date: 11/3/1964     Years since quittin.5    Smokeless tobacco: Never Used   Substance Use Topics    Alcohol use: Not on file      Social History     Social History Narrative    Not on file     History reviewed. No pertinent family history. [unfilled]  Current Outpatient Medications   Medication Sig    apixaban (ELIQUIS) 5 MG TABS tablet Take 5 mg by mouth 2 times daily    ascorbic acid (VITAMIN C) 500 MG tablet Take 500 mg by mouth daily    aspirin 81 MG EC tablet Take 81 mg by mouth daily    vitamin D (CHOLECALCIFEROL) 25 MCG (1000 UT) TABS tablet Take 1,000 Units by mouth daily    Cyanocobalamin 1000 MCG SUBL The details of the medication are not available because there are pending changes by a home health clinician.     donepezil (ARICEPT) 10 MG tablet Take 10 mg by mouth daily    isosorbide mononitrate (IMDUR) 60 MG extended release tablet Take 30 mg by mouth    melatonin 5 MG TABS tablet Take 10 mg by mouth    memantine (NAMENDA) 10 MG tablet Take 10 mg by mouth 2 times daily    nitroGLYCERIN (NITROSTAT) 0.4 MG SL tablet Place 0.4 mg under the tongue as needed    sertraline (ZOLOFT) 50 MG tablet TAKE 1/2 A TABLET BY MOUTH AT BEDTIME FOR 2 WEEKS THEN INCREASE TO 1 TABLET AT BEDTIME    simvastatin (ZOCOR) 40 MG tablet Take 20 mg by mouth     No current facility-administered medications for this encounter. Objective:     Vitals:    05/24/22 1600 05/24/22 1604   BP:  130/65   Pulse:  66   Resp:  18   Temp:  99 °F (37.2 °C)   TempSrc:  Temporal   Weight: 141 lb (64 kg)    Height: 6' (1.829 m)        Physical Exam:  Ambulation: Wheelchair  Gen- the patient is well developed and in no acute distress  HEENT- no focal abnormalities of the scalp or face. Neck- no JVD or retractions  Lungs- normal respiratory effort. Cardiovascular:  Lower limb pulses: Not evaluated. Abd- soft and no masses evident. Ext- warm without cyanosis. Skin- no jaundice or rashes. Traumatic wound dorsum left hand has healed. Please see the specific measurements and descriptions of the wound(s) below. There is no evidence of periwound induration or warmth. No purulence or odor. Neuro- alert and not oriented. No gross motor deficits are present.   Psychological: Not verbally responsive         Assessment:   Traumatic wound dorsum left hand, healed  Patient Active Problem List   Diagnosis    Normocytic anemia    Axonal neuropathy    S/P CABG (coronary artery bypass graft)    History of atrial flutter    Unsteady gait    Reactive depression    Leukocytosis    B12 deficiency    Acute metabolic encephalopathy    Peripheral vascular disease ([facility] [address]    First degree AV block    Late onset Alzheimer's disease without behavioral disturbance ([facility] [address]    Open wound of left hand     Plan:   Discontinue dressing changes, follow-up at wound center as needed. [unfilled]      Thank you very much for the opportunity to participate in this patient's care. [unfilled]    TIME:  If total time for today's E/M service is used for level of service it is documented below. This time includes physician non-face-to-face service time visit on the date of service and includes    Preparing to see the patient (eg, review of tests)  Obtaining and/or reviewing separately obtained history  Performing a medically necessary appropriate examination and/or evaluation  Counseling and educating the patient/family/caregiver  Ordering medications, tests, or procedures  Referring and communicating with other health care professionals as needed  Documenting clinical information in the electronic or other health record  Independently interpreting results (not reported separately) and communicating results to the patient/family/caregiver  Care coordination (not reported separately)    E/M time =      15    minutes.

## 2022-05-26 ENCOUNTER — HOME CARE VISIT (OUTPATIENT)
Dept: SCHEDULING | Facility: HOME HEALTH | Age: 87
End: 2022-05-26
Payer: MEDICARE

## 2022-05-26 PROCEDURE — 400014 HH F/U

## 2022-05-26 PROCEDURE — G0299 HHS/HOSPICE OF RN EA 15 MIN: HCPCS

## 2022-05-27 VITALS
SYSTOLIC BLOOD PRESSURE: 140 MMHG | TEMPERATURE: 98.4 F | OXYGEN SATURATION: 98 % | RESPIRATION RATE: 19 BRPM | HEART RATE: 60 BPM | DIASTOLIC BLOOD PRESSURE: 84 MMHG

## 2022-05-31 ENCOUNTER — HOME CARE VISIT (OUTPATIENT)
Dept: SCHEDULING | Facility: HOME HEALTH | Age: 87
End: 2022-05-31
Payer: MEDICARE

## 2022-05-31 VITALS
OXYGEN SATURATION: 98 % | DIASTOLIC BLOOD PRESSURE: 76 MMHG | HEART RATE: 60 BPM | TEMPERATURE: 98.7 F | RESPIRATION RATE: 17 BRPM | SYSTOLIC BLOOD PRESSURE: 126 MMHG

## 2022-05-31 PROCEDURE — G0299 HHS/HOSPICE OF RN EA 15 MIN: HCPCS

## 2022-06-07 ENCOUNTER — HOME CARE VISIT (OUTPATIENT)
Dept: SCHEDULING | Facility: HOME HEALTH | Age: 87
End: 2022-06-07
Payer: MEDICARE

## 2022-06-07 PROCEDURE — G0299 HHS/HOSPICE OF RN EA 15 MIN: HCPCS

## 2022-06-08 VITALS
OXYGEN SATURATION: 97 % | DIASTOLIC BLOOD PRESSURE: 70 MMHG | RESPIRATION RATE: 17 BRPM | HEART RATE: 60 BPM | SYSTOLIC BLOOD PRESSURE: 130 MMHG | TEMPERATURE: 97.5 F

## 2022-06-14 ENCOUNTER — HOME CARE VISIT (OUTPATIENT)
Dept: SCHEDULING | Facility: HOME HEALTH | Age: 87
End: 2022-06-14
Payer: MEDICARE

## 2022-06-14 VITALS
OXYGEN SATURATION: 96 % | DIASTOLIC BLOOD PRESSURE: 72 MMHG | HEART RATE: 62 BPM | RESPIRATION RATE: 19 BRPM | TEMPERATURE: 98 F | SYSTOLIC BLOOD PRESSURE: 144 MMHG

## 2022-06-14 PROCEDURE — G0299 HHS/HOSPICE OF RN EA 15 MIN: HCPCS

## 2022-06-20 ENCOUNTER — NURSE TRIAGE (OUTPATIENT)
Dept: OTHER | Facility: CLINIC | Age: 87
End: 2022-06-20

## 2022-06-20 NOTE — TELEPHONE ENCOUNTER
Received call from [facility] with Red Flag Complaint. Subjective: Caller states \"left hand and arm swelling\"     Current Symptoms: left hand and arm swelling;   Skin tear injury 3/17/2022 and had to have wound therapy;  Holding left shoulder also; Has dementia and is non verbal;  On Eliquis; Onset: May 2022 ; worsening    Associated Symptoms: NA    Pain Severity: non verbal    Temperature: denies     What has been tried: cold packs; Tylenol seems to help;    Recommended disposition: See PCP within 24 Hours    Care advice provided, patient verbalizes understanding; denies any other questions or concerns; instructed to call back for any new or worsening symptoms. Patient/Caller agrees with recommended disposition; writer provided warm transfer to [facility] for appointment scheduling     Attention Provider: Thank you for allowing me to participate in the care of your patient. The patient was connected to triage in response to information provided to the ECC/PSC. Please do not respond through this encounter as the response is not directed to a shared pool.     Reason for Disposition   MODERATE arm swelling (e.g., puffiness or swollen feeling of entire arm)    Protocols used: ARM SWELLING AND EDEMA-ADULT-AH

## 2022-07-15 ENCOUNTER — APPOINTMENT (OUTPATIENT)
Dept: GENERAL RADIOLOGY | Age: 87
End: 2022-07-15
Payer: MEDICARE

## 2022-07-15 ENCOUNTER — HOSPITAL ENCOUNTER (EMERGENCY)
Age: 87
Discharge: HOME | End: 2022-07-15
Attending: EMERGENCY MEDICINE
Payer: MEDICARE

## 2022-07-15 VITALS
BODY MASS INDEX: 21.22 KG/M2 | DIASTOLIC BLOOD PRESSURE: 76 MMHG | TEMPERATURE: 98.8 F | SYSTOLIC BLOOD PRESSURE: 151 MMHG | RESPIRATION RATE: 18 BRPM | OXYGEN SATURATION: 96 % | HEIGHT: 68 IN | HEART RATE: 63 BPM | WEIGHT: 140 LBS

## 2022-07-15 DIAGNOSIS — R53.83 MALAISE AND FATIGUE: Primary | ICD-10-CM

## 2022-07-15 DIAGNOSIS — N39.0 URINARY TRACT INFECTION WITH HEMATURIA, SITE UNSPECIFIED: ICD-10-CM

## 2022-07-15 DIAGNOSIS — R31.9 URINARY TRACT INFECTION WITH HEMATURIA, SITE UNSPECIFIED: ICD-10-CM

## 2022-07-15 DIAGNOSIS — R53.81 MALAISE AND FATIGUE: Primary | ICD-10-CM

## 2022-07-15 LAB
ALBUMIN SERPL-MCNC: 3.7 G/DL (ref 3.2–4.6)
ALBUMIN/GLOB SERPL: 1.7 {RATIO}
ALP SERPL-CCNC: 90 U/L (ref 45–117)
ALT SERPL-CCNC: <3 U/L (ref 13–61)
ANION GAP SERPL CALC-SCNC: 9 MMOL/L (ref 7–16)
APPEARANCE UR: ABNORMAL
AST SERPL-CCNC: 13 U/L (ref 15–37)
BACTERIA URNS QL MICRO: ABNORMAL /HPF
BASOPHILS # BLD: 0.1 K/UL (ref 0–0.2)
BASOPHILS NFR BLD: 1 % (ref 0–2)
BILIRUB SERPL-MCNC: 0.3 MG/DL (ref 0.2–1.1)
BILIRUB UR QL: NEGATIVE
BUN SERPL-MCNC: 38 MG/DL (ref 7–18)
CALCIUM SERPL-MCNC: 9.1 MG/DL (ref 8.3–10.4)
CASTS URNS QL MICRO: 0 /LPF
CHLORIDE SERPL-SCNC: 107 MMOL/L (ref 98–107)
CO2 SERPL-SCNC: 28 MMOL/L (ref 21–32)
COLOR UR: ABNORMAL
CREAT SERPL-MCNC: 0.85 MG/DL (ref 0.8–1.5)
CRYSTALS URNS QL MICRO: 0 /LPF
DIFFERENTIAL METHOD BLD: ABNORMAL
EOSINOPHIL # BLD: 0.4 K/UL (ref 0–0.8)
EOSINOPHIL NFR BLD: 4 % (ref 0.5–7.8)
EPI CELLS #/AREA URNS HPF: ABNORMAL /HPF
ERYTHROCYTE [DISTWIDTH] IN BLOOD BY AUTOMATED COUNT: 16.7 % (ref 11.9–14.6)
GLOBULIN SER CALC-MCNC: 2.2 G/DL (ref 2.3–3.5)
GLUCOSE SERPL-MCNC: 134 MG/DL (ref 65–100)
GLUCOSE UR STRIP.AUTO-MCNC: NEGATIVE MG/DL
HCT VFR BLD AUTO: 34 % (ref 41.1–50.3)
HGB BLD-MCNC: 10.2 G/DL (ref 13.6–17.2)
HGB UR QL STRIP: ABNORMAL
IMM GRANULOCYTES # BLD AUTO: 0 K/UL (ref 0–0.5)
IMM GRANULOCYTES NFR BLD AUTO: 0 % (ref 0–5)
KETONES UR QL STRIP.AUTO: NEGATIVE MG/DL
LACTATE SERPL-SCNC: 1.9 MMOL/L (ref 0.4–2)
LEUKOCYTE ESTERASE UR QL STRIP.AUTO: NEGATIVE
LYMPHOCYTES # BLD: 1 K/UL (ref 0.5–4.6)
LYMPHOCYTES NFR BLD: 11 % (ref 13–44)
MCH RBC QN AUTO: 24.8 PG (ref 26.1–32.9)
MCHC RBC AUTO-ENTMCNC: 30 G/DL (ref 31.4–35)
MCV RBC AUTO: 82.7 FL (ref 79.6–97.8)
MONOCYTES # BLD: 0.5 K/UL (ref 0.1–1.3)
MONOCYTES NFR BLD: 6 % (ref 4–12)
MUCOUS THREADS URNS QL MICRO: ABNORMAL /LPF
NEUTS SEG # BLD: 6.9 K/UL (ref 1.7–8.2)
NEUTS SEG NFR BLD: 78 % (ref 43–78)
NITRITE UR QL STRIP.AUTO: NEGATIVE
NRBC # BLD: 0 K/UL (ref 0–0.2)
OTHER OBSERVATIONS: ABNORMAL
PH UR STRIP: 5 [PH] (ref 5–9)
PLATELET # BLD AUTO: 221 K/UL (ref 150–450)
PMV BLD AUTO: 10.2 FL (ref 9.4–12.3)
POTASSIUM SERPL-SCNC: 4.1 MMOL/L (ref 3.5–5.1)
PROT SERPL-MCNC: 5.9 G/DL (ref 6.4–8.2)
PROT UR STRIP-MCNC: ABNORMAL MG/DL
RBC # BLD AUTO: 4.11 M/UL (ref 4.23–5.6)
RBC #/AREA URNS HPF: ABNORMAL /HPF
SODIUM SERPL-SCNC: 144 MMOL/L (ref 136–145)
SP GR UR REFRACTOMETRY: >=1.03 (ref 1–1.02)
UROBILINOGEN UR QL STRIP.AUTO: 0.2 EU/DL (ref 0.2–1)
WBC # BLD AUTO: 8.8 K/UL (ref 4.3–11.1)
WBC URNS QL MICRO: ABNORMAL /HPF

## 2022-07-15 PROCEDURE — 99284 EMERGENCY DEPT VISIT MOD MDM: CPT

## 2022-07-15 PROCEDURE — 73030 X-RAY EXAM OF SHOULDER: CPT

## 2022-07-15 PROCEDURE — 96365 THER/PROPH/DIAG IV INF INIT: CPT

## 2022-07-15 PROCEDURE — 6360000002 HC RX W HCPCS: Performed by: EMERGENCY MEDICINE

## 2022-07-15 PROCEDURE — 87086 URINE CULTURE/COLONY COUNT: CPT

## 2022-07-15 PROCEDURE — 80053 COMPREHEN METABOLIC PANEL: CPT

## 2022-07-15 PROCEDURE — 2580000003 HC RX 258: Performed by: EMERGENCY MEDICINE

## 2022-07-15 PROCEDURE — 81001 URINALYSIS AUTO W/SCOPE: CPT

## 2022-07-15 PROCEDURE — 71045 X-RAY EXAM CHEST 1 VIEW: CPT

## 2022-07-15 PROCEDURE — 81015 MICROSCOPIC EXAM OF URINE: CPT

## 2022-07-15 PROCEDURE — 85025 COMPLETE CBC W/AUTO DIFF WBC: CPT

## 2022-07-15 PROCEDURE — 83605 ASSAY OF LACTIC ACID: CPT

## 2022-07-15 PROCEDURE — 51798 US URINE CAPACITY MEASURE: CPT

## 2022-07-15 RX ORDER — CEFDINIR 300 MG/1
300 CAPSULE ORAL 2 TIMES DAILY
Qty: 14 CAPSULE | Refills: 0 | Status: SHIPPED | OUTPATIENT
Start: 2022-07-15 | End: 2022-07-25

## 2022-07-15 RX ADMIN — CEFTRIAXONE 1000 MG: 1 INJECTION, POWDER, FOR SOLUTION INTRAMUSCULAR; INTRAVENOUS at 19:49

## 2022-07-15 ASSESSMENT — ENCOUNTER SYMPTOMS
DIARRHEA: 0
COUGH: 0
VOMITING: 0
RHINORRHEA: 0
SHORTNESS OF BREATH: 0

## 2022-07-15 ASSESSMENT — PAIN - FUNCTIONAL ASSESSMENT
PAIN_FUNCTIONAL_ASSESSMENT: NONE - DENIES PAIN
PAIN_FUNCTIONAL_ASSESSMENT: ADULT NONVERBAL PAIN SCALE (NPVS)

## 2022-07-15 NOTE — ED TRIAGE NOTES
Arrives via w/c from home. Pt lives with daughter. He is non verbal at baseline. Daughter reports increased swelling to left arm, reports new hematoma to left forearm and left hand. Daughter reports pt grabs at left shoulder possibly suggesting pain. Daughter also reports possible UTI because pt is not acting like himself. Daughter also reports pt has enalrged prostate and whenever she cleans him up he acts like he is in pain. Pt also has intermittent cough.

## 2022-07-15 NOTE — ED PROVIDER NOTES
[employer] Emergency Department Provider Note                   PCP: Jose Francisco Bustamante MD               Age: 80 y.o. Sex: male     No diagnosis found. DISPOSITION         New Prescriptions    No medications on file       Orders Placed This Encounter   Procedures    Culture, Urine    XR CHEST PORTABLE    XR SHOULDER LEFT (MIN 2 VIEWS)    Urinalysis    CBC with Auto Differential    Comprehensive Metabolic Panel    Lactic Acid    Bladder scan    Straight cath         Rosario Lao is a 80 y.o. male who presents to the Emergency Department with chief complaint of    Chief Complaint   Patient presents with    Urinary Retention      80-year old gentleman presents from home with his daughter who is his primary caretaker for concerns of UTI and altered mental status. Patient is nonverbal though can nod yes and no to questions when he chooses to. Daughter provides most of the history, patient has severe Alzheimer's disease. They have noticed over the past couple days particularly yesterday and today that he is less agreeable and participatory and his caretaking. For example he is usually does a fair amount to help with his socks and shoes and clothing, but in the last 2 days is pretty much been noncontributory to these efforts. His urine has a strong odor making them concerned he may have a recurrent UTI. Patient was hospitalized at [address] [place] [address] from February 24 till March 1 of this year for acute metabolic encephalopathy with a UTI. He went to rehab for some time and then has been at home since. Another chief concern of the daughter is that he has been clutching his left shoulder with his right hand as though it hurts. No recent falls. They have also noticed waxing and waning edema and hematomas to the lower half of the left upper extremity.   Patient apparently sustained a pretty significant skin tear /laceration to the left hand and wrist area when his hand was grazed by the door jam while walking. Numerous visits with home health and wound healing center so finally gotten it healed up, although for the last 6 weeks they noticed intermittent swelling and bruising    No vomiting, no diarrhea, PCP noted some prostate enlargement on rectal exam recently, and patient had an elevated PSA. He does not seem to have any urinary retention, and is typically incontinent of urine into his briefs at home. Daughter brings in a urine specimen which she collected by draining Free voided urine from a pad. As an example of patient's \"defiance\" (her description), when she has tried to collect a urine specimen freely voided into a collection cup, patient simply refuses to void. Review of Systems   Unable to perform ROS: Dementia   Constitutional:  Negative for chills, diaphoresis and fever. HENT:  Negative for congestion, ear discharge and rhinorrhea. Respiratory:  Negative for cough and shortness of breath. Cardiovascular:  Negative for leg swelling. Gastrointestinal:  Negative for diarrhea and vomiting. Genitourinary:  Positive for difficulty urinating. Musculoskeletal:  Positive for arthralgias and myalgias. Psychiatric/Behavioral:  Positive for behavioral problems and decreased concentration. All other systems reviewed and are negative. All other systems reviewed and are negative. 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 Social Connections: Not on file        Allergies   Allergen Reactions    Penicillins Hives        Vitals signs and nursing note reviewed.    Patient Vitals for the past 4 hrs:   Temp Pulse Resp BP SpO2   07/15/22 1634 99 °F (37.2 °C) 64 17 (!) 167/105 96 %          Physical Exam  Vitals and nursing note reviewed. Constitutional:       General: He is not in acute distress. Appearance: Normal appearance. He is normal weight. He is not ill-appearing, toxic-appearing or diaphoretic. Comments: Nonverbal during history and physical  Nods neither yes or no to questions   HENT:      Head: Normocephalic and atraumatic. Right Ear: External ear normal.      Left Ear: External ear normal.      Nose: Nose normal. No rhinorrhea. Eyes:      General: No scleral icterus. Right eye: No discharge. Left eye: No discharge. Extraocular Movements: Extraocular movements intact. Pulmonary:      Effort: Pulmonary effort is normal. No respiratory distress. Musculoskeletal:         General: Swelling and tenderness present. No deformity or signs of injury. Left shoulder: Tenderness present. Decreased range of motion. Cervical back: Normal range of motion and neck supple. Skin:     General: Skin is warm and dry. Coloration: Skin is not jaundiced or pale. Neurological:      General: No focal deficit present. Mental Status: He is alert and oriented to person, place, and time. Mental status is at baseline.       Gait: Gait normal.   Psychiatric:         Mood and Affect: Mood normal.         Behavior: Behavior normal.        MDM  Number of Diagnoses or Management Options  Diagnosis management comments: Elderly gentleman with decreased ADLs, concern for UTI,  Will check lab work and x-rays to include left shoulder,  Bladder scan shows may be 40 mL we will check a cath UA, and blood work       Amount and/or Complexity of Data Reviewed  Clinical lab tests: reviewed and ordered  Tests in the radiology section of CPT®: reviewed and ordered  Decide to obtain previous medical records or to obtain history from someone other than the patient: yes  Obtain history from someone other than the patient: yes (Daughter at bedside)    Risk of Complications, Morbidity, and/or Mortality  Presenting problems: moderate  Diagnostic procedures: low  Management options: low    Patient Progress  Patient progress: stable      Procedures    Labs Reviewed   CULTURE, URINE   URINALYSIS   CBC WITH AUTO DIFFERENTIAL   COMPREHENSIVE METABOLIC PANEL   LACTIC ACID   LACTIC ACID        XR CHEST PORTABLE    (Results Pending)   XR SHOULDER LEFT (MIN 2 VIEWS)    (Results Pending)            Gregorio Coma Scale  Eye Opening: Spontaneous  Best Verbal Response: Incomprehensible speech  Best Motor Response: Localizes pain  [place] Coma Scale Score: 11                     Voice dictation software was used during the making of this note. This software is not perfect and grammatical and other typographical errors may be present. This note has not been completely proofread for errors.        Aaliyah Luz MD  07/15/22 3042

## 2022-07-18 LAB
BACTERIA SPEC CULT: NORMAL
SERVICE CMNT-IMP: NORMAL

## 2022-08-01 ENCOUNTER — OFFICE VISIT (OUTPATIENT)
Dept: FAMILY MEDICINE CLINIC | Facility: CLINIC | Age: 87
End: 2022-08-01
Payer: MEDICARE

## 2022-08-01 VITALS
BODY MASS INDEX: 20.73 KG/M2 | HEART RATE: 66 BPM | OXYGEN SATURATION: 96 % | WEIGHT: 140 LBS | SYSTOLIC BLOOD PRESSURE: 120 MMHG | DIASTOLIC BLOOD PRESSURE: 70 MMHG | HEIGHT: 69 IN

## 2022-08-01 DIAGNOSIS — E86.0 DEHYDRATION: Primary | ICD-10-CM

## 2022-08-01 DIAGNOSIS — I73.9 PERIPHERAL VASCULAR DISEASE, UNSPECIFIED (HCC): ICD-10-CM

## 2022-08-01 DIAGNOSIS — M25.512 LEFT SHOULDER PAIN, UNSPECIFIED CHRONICITY: ICD-10-CM

## 2022-08-01 LAB
ALBUMIN SERPL-MCNC: 3.6 G/DL (ref 3.2–4.6)
ALBUMIN/GLOB SERPL: 1.3 {RATIO} (ref 1.2–3.5)
ALP SERPL-CCNC: 122 U/L (ref 50–136)
ALT SERPL-CCNC: 15 U/L (ref 12–65)
ANION GAP SERPL CALC-SCNC: 6 MMOL/L (ref 7–16)
AST SERPL-CCNC: 16 U/L (ref 15–37)
BILIRUB SERPL-MCNC: 0.4 MG/DL (ref 0.2–1.1)
BUN SERPL-MCNC: 29 MG/DL (ref 8–23)
CALCIUM SERPL-MCNC: 9.2 MG/DL (ref 8.3–10.4)
CHLORIDE SERPL-SCNC: 112 MMOL/L (ref 98–107)
CO2 SERPL-SCNC: 26 MMOL/L (ref 21–32)
CREAT SERPL-MCNC: 1 MG/DL (ref 0.8–1.5)
GLOBULIN SER CALC-MCNC: 2.7 G/DL (ref 2.3–3.5)
GLUCOSE SERPL-MCNC: 115 MG/DL (ref 65–100)
POTASSIUM SERPL-SCNC: 3.7 MMOL/L (ref 3.5–5.1)
PROT SERPL-MCNC: 6.3 G/DL (ref 6.3–8.2)
SODIUM SERPL-SCNC: 144 MMOL/L (ref 136–145)

## 2022-08-01 PROCEDURE — 99213 OFFICE O/P EST LOW 20 MIN: CPT | Performed by: FAMILY MEDICINE

## 2022-08-01 PROCEDURE — 1123F ACP DISCUSS/DSCN MKR DOCD: CPT | Performed by: FAMILY MEDICINE

## 2022-08-01 ASSESSMENT — PATIENT HEALTH QUESTIONNAIRE - PHQ9
1. LITTLE INTEREST OR PLEASURE IN DOING THINGS: 0
SUM OF ALL RESPONSES TO PHQ QUESTIONS 1-9: 0
2. FEELING DOWN, DEPRESSED OR HOPELESS: 0
SUM OF ALL RESPONSES TO PHQ9 QUESTIONS 1 & 2: 0
SUM OF ALL RESPONSES TO PHQ QUESTIONS 1-9: 0

## 2022-08-16 ASSESSMENT — ENCOUNTER SYMPTOMS
GASTROINTESTINAL NEGATIVE: 1
EYES NEGATIVE: 1
RESPIRATORY NEGATIVE: 1

## 2022-08-16 NOTE — PROGRESS NOTES
gait 3/16/2021        Past Surgical History:   Procedure Laterality Date    PACEMAKER          Social History     Socioeconomic History    Marital status:      Spouse name: Not on file    Number of children: Not on file    Years of education: Not on file    Highest education level: Not on file   Occupational History    Not on file   Tobacco Use    Smoking status: Former     Types: Cigarettes     Quit date: 11/3/1964     Years since quittin.8    Smokeless tobacco: Never   Substance and Sexual Activity    Alcohol use: Not on file    Drug use: Not on file    Sexual activity: Not on file   Other Topics Concern    Not on file   Social History Narrative    Not on file     Social Determinants of Health     Financial Resource Strain: Not on file   Food Insecurity: Not on file   Transportation Needs: Not on file   Physical Activity: Not on file   Stress: Not on file   Social Connections: Not on file   Intimate Partner Violence: Not on file   Housing Stability: Not on file        Review of Systems   Constitutional:  Positive for fatigue. HENT:  Positive for drooling. Eyes: Negative. Respiratory: Negative. Cardiovascular: Negative. Gastrointestinal: Negative. Endocrine: Negative. Genitourinary:  Positive for frequency. Skin: Negative. Neurological:  Positive for weakness. Psychiatric/Behavioral:  Positive for memory loss. /70 (Site: Right Upper Arm, Position: Sitting, Cuff Size: Medium Adult)   Pulse 66   Ht 5' 9\" (1.753 m)   Wt 140 lb (63.5 kg)   SpO2 96%   BMI 20.67 kg/m²      Physical Exam  Constitutional:       General: He is not in acute distress. Appearance: Normal appearance. HENT:      Head: Normocephalic. Nose: Nose normal.   Eyes:      Conjunctiva/sclera: Conjunctivae normal.   Cardiovascular:      Rate and Rhythm: Normal rate. Pulmonary:      Effort: Pulmonary effort is normal.      Breath sounds: Normal breath sounds.    Abdominal:      General: Lymphocytes 07/15/2022 11 (A) 13 - 44 % Final    Monocytes 07/15/2022 6  4.0 - 12.0 % Final    Eosinophils % 07/15/2022 4  0.5 - 7.8 % Final    Basophils 07/15/2022 1  0.0 - 2.0 % Final    Immature Granulocytes 07/15/2022 0  0.0 - 5.0 % Final    Segs Absolute 07/15/2022 6.9  1.7 - 8.2 K/UL Final    Absolute Lymph # 07/15/2022 1.0  0.5 - 4.6 K/UL Final    Absolute Mono # 07/15/2022 0.5  0.1 - 1.3 K/UL Final    Absolute Eos # 07/15/2022 0.4  0.0 - 0.8 K/UL Final    Basophils Absolute 07/15/2022 0.1  0.0 - 0.2 K/UL Final    Absolute Immature Granulocyte 07/15/2022 0.0  0.0 - 0.5 K/UL Final    Sodium 07/15/2022 144  136 - 145 mmol/L Final    Potassium 07/15/2022 4.1  3.5 - 5.1 mmol/L Final    Chloride 07/15/2022 107  98 - 107 mmol/L Final    CO2 07/15/2022 28  21 - 32 mmol/L Final    Anion Gap 07/15/2022 9  7.0 - 16.0 mmol/L Final    Glucose 07/15/2022 134 (A) 65 - 100 mg/dL Final    Comment: 47 - 60 mg/dl Consistent with, but not fully diagnostic of hypoglycemia. 101 - 125 mg/dl Impaired fasting glucose/consistent with pre-diabetes mellitus  > 126 mg/dl Fasting glucose consistent with overt diabetes mellitus      BUN 07/15/2022 38 (A) 7.0 - 18.0 MG/DL Final    Creatinine 07/15/2022 0.85  0.8 - 1.5 MG/DL Final    GFR  07/15/2022 >109  >60 ml/min/1.73m2 Final    GFR Non- 07/15/2022 >60  >60 ml/min/1.73m2 Final    Comment: (NOTE)  Estimated GFR is calculated using the Modification of Diet in Renal   Disease (MDRD) Study equation, reported for both  Americans   (GFRAA) and non- Americans (GFRNA), and normalized to 1.73m2   body surface area. The physician must decide which value applies to   the patient. The MDRD study equation should only be used in   individuals age 25 or older.  It has not been validated for the   following: pregnant women, patients with serious comorbid conditions,   or on certain medications, or persons with extremes of body size,   muscle mass, or

## 2022-09-14 ENCOUNTER — TELEPHONE (OUTPATIENT)
Dept: FAMILY MEDICINE CLINIC | Facility: CLINIC | Age: 87
End: 2022-09-14

## 2024-07-22 NOTE — H&P
[facility] Initial History and Physical Note    Patient: Stephenie Gomez Date: 2/24/2022  male, 80 y.o. Admit Date: 2/24/2022  Attending: Femi Pillai MD     ASSESSMENT AND PLAN:     Principal Problem:    Acute metabolic encephalopathy (8/47/3947)    Secondary to UTI. Treat per below. Active Problems:    UTI (urinary tract infection) (2/24/2022)    Likely 2/2 E. Coli. IV Rocephin. IVF, follow urine/blood cultures. Leukocytosis (2/24/2022)    Follow CBC      Normocytic anemia (2/24/2022)    Follow CBC      Peripheral vascular disease ([address] (6/14/2020)    Stable. Continue home meds. Late onset Alzheimer's disease without behavioral disturbance ([address] (8/4/2020)    Stable. Continue home meds. DVT Prophylaxis: Continue home Eliquis      Code Status: FULL CODE      Disposition: Admit to med/surg for evaluation and treatment as per above. Anticipated discharge: 2-3 days     CHIEF COMPLAINT:  confusion    HISTORY OF PRESENT ILLNESS:      Patient Active Problem List   Diagnosis Code    History of atrial flutter Z86.79    First degree AV block I44.0    S/P CABG (coronary artery bypass graft) Z95.1    Peripheral vascular disease (HCC) I73.9    Late onset Alzheimer's disease without behavioral disturbance (HCC) G30.1, F02.80    Axonal neuropathy G62.89    B12 deficiency E53.8    Reactive depression F32.9    Unsteady gait R26.81    Acute metabolic encephalopathy R63.20    UTI (urinary tract infection) N39.0    Leukocytosis D72.829    Normocytic anemia D64.9       Stephenie Gomez is a 80 y.o. male, with a history of  has a past medical history of Axonal neuropathy (8/4/2020), Late onset Alzheimer's disease without behavioral disturbance ([address] (8/4/2020), Reactive depression (11/18/2020), and Unsteady gait (3/16/2021). ,  has no past surgical history on file.  who presents to the Er with report of worsening confusion over the past 3-4 days along with worsening ability to walk on his own. Patient was found in the floor last night but there was no evidence of trauma from a fall. Pt is nonverbal.    Allergy  Allergies   Allergen Reactions    Penicillins Hives       Medication list  Prior to Admission Medications   Prescriptions Last Dose Informant Patient Reported? Taking? apixaban (ELIQUIS) 5 mg tablet   Yes No   Sig: Take 5 mg by mouth two (2) times a day. aspirin delayed-release 81 mg tablet   Yes No   Sig: Take 81 mg by mouth daily. cholecalciferol (VITAMIN D3) (1000 Units /25 mcg) tablet   Yes No   Sig: Take 1,000 Units by mouth.   cyanocobalamin (VITAMIN B-12) 1,000 mcg sublingual tablet   No No   Si tablet daily under the tongue dissolve   donepeziL (ARICEPT) 10 mg tablet   No No   Sig: Take 1 Tablet by mouth daily. isosorbide mononitrate ER (IMDUR) 60 mg CR tablet   Yes No   Sig: Take 60 mg by mouth daily. memantine (NAMENDA) 10 mg tablet   No No   Sig: Take 1 Tablet by mouth two (2) times a day. nitroglycerin (NITROSTAT) 0.4 mg SL tablet   Yes No   Sig: Take 0.4 mg by mouth as needed. sertraline (ZOLOFT) 50 mg tablet   No No   Sig: TAKE 1/2 A TABLET BY MOUTH AT BEDTIME FOR 2 WEEKS THEN INCREASE TO 1 TABLET AT BEDTIME   simvastatin (ZOCOR) 40 mg tablet   Yes No   Sig: Take 40 mg by mouth daily. Facility-Administered Medications: None       Past Medical History   Past Medical History:   Diagnosis Date    Axonal neuropathy 2020    Late onset Alzheimer's disease without behavioral disturbance ([facility] 2020    Reactive depression 2020    Unsteady gait 3/16/2021       No past surgical history on file.     Social History   Social History     Socioeconomic History    Marital status:    Tobacco Use    Smoking status: Former Smoker     Quit date: 11/3/1964     Years since quittin.4    Smokeless tobacco: Never Used       Family History:   Reviewed, negative    REVIEW OF SYSTEMS:   A 14 point review of systems was taken and pertinent positive as per HPI. PHYSICAL EXAMINATION:  Vital 24 Hour Range Most Recent Value  Temperature Temp  Min: 97.4 °F (36.3 °C)  Max: 97.4 °F (36.3 °C) 97.4 °F (36.3 °C)    Pulse Pulse  Min: 60  Max: 60 60  Respiratory Resp  Min: 16  Max: 34 (!) 34  Blood Pressure BP  Min: 166/75  Max: 172/77 (!) 172/77  Pulse Oximetry SpO2  Min: 98 %  Max: 99 % 98 %  O2 No data recorded      Vital Most Recent Value First Value  Weight 70.3 kg (155 lb) Weight: 70.3 kg (155 lb)  Height 6' (182.9 cm) Height: 6' (182.9 cm)  BMI   N/A    Physical Exam:   General:     No acute distress, nonverbal    Eyes:   No palpebral pallor or scleral icterus. ENT:   External auricular and nasal exam within normal limits. Cardiovascular: No cyanosis or edema of extremities. Respiratory:    No respiratory distress or accessory muscle use. Gastrointestinal:   Not actively vomiting, abdomen non-distended   Skin:      Normal color. No rashes, lesions, or jaundice.   Neurologic:  Nonverbal  Psychiatric:  Disoriented      Intake/Output last 3 shifts:      Labs:  magnesium:7,phos:7)CMP:   Lab Results   Component Value Date/Time     02/24/2022 03:27 PM    K 3.9 02/24/2022 03:27 PM     (H) 02/24/2022 03:27 PM    CO2 30 02/24/2022 03:27 PM    AGAP 3 (L) 02/24/2022 03:27 PM    GLU 85 02/24/2022 03:27 PM    BUN 30 (H) 02/24/2022 03:27 PM    CREA 1.21 02/24/2022 03:27 PM    GFRAA >60 02/24/2022 03:27 PM    GFRNA >60 02/24/2022 03:27 PM    CA 9.1 02/24/2022 03:27 PM    ALB 3.4 02/24/2022 03:27 PM    TBILI 0.5 02/24/2022 03:27 PM    TP 6.6 02/24/2022 03:27 PM    GLOB 3.2 02/24/2022 03:27 PM    AGRAT 1.1 (L) 02/24/2022 03:27 PM    ALT 18 02/24/2022 03:27 PM         CBC:    Lab Results   Component Value Date/Time    WBC 11.6 (H) 02/24/2022 03:27 PM    HGB 11.8 (L) 02/24/2022 03:27 PM    HCT 38.8 (L) 02/24/2022 03:27 PM     02/24/2022 03:27 PM       No results found for: INR, PTMR, PTP, PT1, PT2, INREXT    ABG:  No results found for: PH, PHI, PCO2, PCO2I, PO2, PO2I, HCO3, HCO3I, FIO2, FIO2I        No results found for: CPK, RCK1, RCK2, RCK3, RCK4, CKMB, CKNDX, CKND1, TROPT, TROIQ, BNPP, BNP    Imagining & Other Studies    XR Results (maximum last 3): Results from [place] encounter on 02/24/22    XR PELV AP ONLY    Narrative  Pelvis    INDICATION: Leg pain after fall    A supine view of the pelvis was obtained. There is no evidence of hip fracture  or dislocation. Bony pelvis also appears intact. There is moderate vascular  calcification. Impression  No acute findings      XR CHEST PORT    Narrative  EXAMINATION: CHEST RADIOGRAPH 2/24/2022 3:27 PM    ACCESSION NUMBER: [id number]    INDICATION: AMS/confusion    COMPARISON: Chest x-ray 9/12/2007. TECHNIQUE: A single view of the chest was obtained. FINDINGS:    Support Lines and Tubes: None    Cardiac Silhouette: Prior median sternotomy and coronary artery bypass grafting. Pacemaker with right atrial and right ventricular leads. Lungs: Minimal linear opacity along the left heart border. Pleura: No pleural effusion. No pneumothorax. Osseous Structures: Mildly displaced right clavicular fracture    Upper Abdomen: Unremarkable. Impression  1. There is a mildly displaced oblique fracture of the right clavicular  diaphysis. There is no evidence of underlying pneumothorax. 2. Trace atelectasis along the left heart border. 3. No evidence of a focal pneumonia. Results from [place] encounter on 09/26/20    XR SHOULDER LT AP/LAT MIN 2 V    Narrative  Left shoulder    CLINICAL INDICATION: Left shoulder pain after a fall nine days ago    FINDINGS: Four views of the left shoulder show no fracture or dislocation. The  joint spaces are maintained. Mild degenerative hypertrophy is noted of the [facility]  joint. A pacer device is in place. Impression  IMPRESSION: Mild degenerative hypertrophy of the [facility] joint. No acute osseous  abnormality of the left shoulder.       CT Results (maximum last 3): Results from [facility] encounter on 02/24/22    CT SPINE CERV WO CONT    Narrative  CT of the Head and Cervical Spine    INDICATION:  Fall, altered mental status    Multiple axial images obtained through the brain without intravenous contrast.  High resolution axial images were then obtained through the cervical spine. Coronal and sagittal reformats were also evaluated. Radiation dose reduction  techniques were used for this study: All CT scans performed at this facility  use one or all of the following: Automated exposure control, adjustment of the  mA and/or kVp according to patient's size, iterative reconstruction. Head CT:  No areas of abnormal attenuation are seen in the brain. There is no CT  evidence of acute hemorrhage or infarction. The ventricles are normal in size. There are no extra-axial fluid collections. No masses are seen. There are no  bony lesions. Cervical Spine CT: There is mild multilevel degenerative disc disease. There is  no evidence of fracture or subluxation. No bony lesions are seen. There is no  prevertebral soft tissue swelling. There is bilateral carotid atherosclerosis. Impression  1. No CT evidence of acute intracranial abnormality. 2.  No evidence of cervical spine fracture      CT HEAD WO CONT    Narrative  CT of the Head and Cervical Spine    INDICATION:  Fall, altered mental status    Multiple axial images obtained through the brain without intravenous contrast.  High resolution axial images were then obtained through the cervical spine. Coronal and sagittal reformats were also evaluated. Radiation dose reduction  techniques were used for this study: All CT scans performed at this facility  use one or all of the following: Automated exposure control, adjustment of the  mA and/or kVp according to patient's size, iterative reconstruction. Head CT:  No areas of abnormal attenuation are seen in the brain.  There is no CT  evidence of acute hemorrhage or infarction. The ventricles are normal in size. There are no extra-axial fluid collections. No masses are seen. There are no  bony lesions. Cervical Spine CT: There is mild multilevel degenerative disc disease. There is  no evidence of fracture or subluxation. No bony lesions are seen. There is no  prevertebral soft tissue swelling. There is bilateral carotid atherosclerosis. Impression  1. No CT evidence of acute intracranial abnormality. 2.  No evidence of cervical spine fracture      Results from [place] encounter on 09/26/20    CT HEAD WO CONT    Narrative  CT of the head without contrast.    CLINICAL INDICATION: Head trauma after falling out of bed nine days ago    PROCEDURE: Serial thin section axial images are obtained from the cranial vertex  through the skull base without the administration of intravenous contrast.  Radiation dose reduction techniques were used for this study. Our CT scanners  use one or all of the following: Automated exposure control, adjusted of the mA  and/or kV according to patient size, iterative reconstruction    COMPARISON: Head CT dated 6/24/2020. FINDINGS: There is no acute intracranial hemorrhage, mass, or mass effect. No  abnormal extra-axial fluid collections identified. There is no hydrocephalus. The basilar cisterns are widely patent. Moderate bilateral hemispheric and  posterior fossa atrophy evident. Mild periventricular white matter  hypoattenuation is present. No findings present to indicate large,  cortical-based territorial infarction. No skull fracture or aggressive osseous  lesion noted. The mastoid air cells and included paranasal sinuses are clear. Impression  IMPRESSION:  1. No acute intracranial abnormality. 2. Mild cerebral atrophy and chronic white matter microangiopathic changes. MRI Results (maximum last 3): No results found for this or any previous visit.       Nuclear Medicine Results (maximum last 3): No results found for this or any previous visit. US Results (maximum last 3): No results found for this or any previous visit. DEXA Results (maximum last 3): No results found for this or any previous visit. CORRIE Results (maximum last 3): No results found for this or any previous visit. IR Results (maximum last 3): No results found for this or any previous visit. VAS/US Results (maximum last 3): No results found for this or any previous visit. PET Results (maximum last 3): No results found for this or any previous visit.         EKG Results     Procedure 720 Value Units Date/Time    EKG 12 LEAD INITIAL [[id number]]     Order Status: Sent     EKG, 12 LEAD, INITIAL [[id number]]     Order Status: Completed           Dash Guerrero MD  2/24/2022 8:49 PM No